# Patient Record
Sex: FEMALE | Race: WHITE | NOT HISPANIC OR LATINO | Employment: UNEMPLOYED | URBAN - METROPOLITAN AREA
[De-identification: names, ages, dates, MRNs, and addresses within clinical notes are randomized per-mention and may not be internally consistent; named-entity substitution may affect disease eponyms.]

---

## 2019-06-10 LAB
ABO + RH BLD: NORMAL
ABO + RH BLD: NORMAL
BLD GP AB SCN SERPL QL: NEGATIVE
HBV SURFACE AG SERPL QL IA: NEGATIVE
HIV 1+2 AB+HIV1 P24 AG SERPL QL IA: NEGATIVE
RUBELLA ANTIBODY IGG QUANTITATIVE: NORMAL IU/ML

## 2019-06-17 ENCOUNTER — ANESTHESIA (OUTPATIENT)
Dept: OBGYN | Facility: CLINIC | Age: 29
End: 2019-06-17
Payer: COMMERCIAL

## 2019-06-17 ENCOUNTER — HOSPITAL ENCOUNTER (INPATIENT)
Facility: CLINIC | Age: 29
LOS: 3 days | Discharge: HOME | End: 2019-06-20
Attending: OBSTETRICS & GYNECOLOGY | Admitting: OBSTETRICS & GYNECOLOGY
Payer: COMMERCIAL

## 2019-06-17 ENCOUNTER — ANESTHESIA EVENT (OUTPATIENT)
Dept: OBGYN | Facility: CLINIC | Age: 29
End: 2019-06-17
Payer: COMMERCIAL

## 2019-06-17 DIAGNOSIS — Z98.891 S/P CESAREAN SECTION: ICD-10-CM

## 2019-06-17 PROBLEM — O60.00 PREMATURE LABOR: Status: ACTIVE | Noted: 2019-06-17

## 2019-06-17 PROBLEM — O42.90 LEAKAGE OF AMNIOTIC FLUID: Status: ACTIVE | Noted: 2019-06-17

## 2019-06-17 PROBLEM — O42.90 RUPTURED, MEMBRANES, PREMATURE: Status: ACTIVE | Noted: 2019-06-17

## 2019-06-17 LAB
ABO + RH BLD: NORMAL
ABO + RH BLD: NORMAL
AMPHETAMINES UR QL SCN: NEGATIVE
BLD GP AB SCN SERPL QL: NORMAL
BLOOD BANK CMNT PATIENT-IMP: NORMAL
CANNABINOIDS UR QL: NEGATIVE
COCAINE UR QL: NEGATIVE
HGB BLD-MCNC: 11.5 G/DL (ref 11.7–15.7)
OPIATES UR QL SCN: NEGATIVE
PCP UR QL SCN: NEGATIVE
RUPTURE OF FETAL MEMBRANES BY ROM PLUS: POSITIVE
SPECIMEN EXP DATE BLD: NORMAL
T PALLIDUM AB SER QL: NONREACTIVE

## 2019-06-17 PROCEDURE — 86850 RBC ANTIBODY SCREEN: CPT | Performed by: OBSTETRICS & GYNECOLOGY

## 2019-06-17 PROCEDURE — 25000125 ZZHC RX 250

## 2019-06-17 PROCEDURE — 12000035 ZZH R&B POSTPARTUM

## 2019-06-17 PROCEDURE — 25800030 ZZH RX IP 258 OP 636: Performed by: OBSTETRICS & GYNECOLOGY

## 2019-06-17 PROCEDURE — 86901 BLOOD TYPING SEROLOGIC RH(D): CPT | Performed by: OBSTETRICS & GYNECOLOGY

## 2019-06-17 PROCEDURE — 25000128 H RX IP 250 OP 636: Performed by: NURSE ANESTHETIST, CERTIFIED REGISTERED

## 2019-06-17 PROCEDURE — 36000058 ZZH SURGERY LEVEL 3 EA 15 ADDTL MIN: Performed by: OBSTETRICS & GYNECOLOGY

## 2019-06-17 PROCEDURE — 71000014 ZZH RECOVERY PHASE 1 LEVEL 2 FIRST HR: Performed by: OBSTETRICS & GYNECOLOGY

## 2019-06-17 PROCEDURE — 36415 COLL VENOUS BLD VENIPUNCTURE: CPT | Performed by: OBSTETRICS & GYNECOLOGY

## 2019-06-17 PROCEDURE — 86900 BLOOD TYPING SEROLOGIC ABO: CPT | Performed by: OBSTETRICS & GYNECOLOGY

## 2019-06-17 PROCEDURE — 25000125 ZZHC RX 250: Performed by: NURSE ANESTHETIST, CERTIFIED REGISTERED

## 2019-06-17 PROCEDURE — 85018 HEMOGLOBIN: CPT | Performed by: OBSTETRICS & GYNECOLOGY

## 2019-06-17 PROCEDURE — 25000128 H RX IP 250 OP 636: Performed by: OBSTETRICS & GYNECOLOGY

## 2019-06-17 PROCEDURE — 37000009 ZZH ANESTHESIA TECHNICAL FEE, EACH ADDTL 15 MIN: Performed by: OBSTETRICS & GYNECOLOGY

## 2019-06-17 PROCEDURE — 86780 TREPONEMA PALLIDUM: CPT | Performed by: OBSTETRICS & GYNECOLOGY

## 2019-06-17 PROCEDURE — G0463 HOSPITAL OUTPT CLINIC VISIT: HCPCS | Mod: 25

## 2019-06-17 PROCEDURE — 25000128 H RX IP 250 OP 636

## 2019-06-17 PROCEDURE — 25000128 H RX IP 250 OP 636: Performed by: ANESTHESIOLOGY

## 2019-06-17 PROCEDURE — 80307 DRUG TEST PRSMV CHEM ANLYZR: CPT | Performed by: SPECIALIST

## 2019-06-17 PROCEDURE — 87653 STREP B DNA AMP PROBE: CPT | Performed by: OBSTETRICS & GYNECOLOGY

## 2019-06-17 PROCEDURE — 25800030 ZZH RX IP 258 OP 636: Performed by: NURSE ANESTHETIST, CERTIFIED REGISTERED

## 2019-06-17 PROCEDURE — 27210794 ZZH OR GENERAL SUPPLY STERILE: Performed by: OBSTETRICS & GYNECOLOGY

## 2019-06-17 PROCEDURE — 25000132 ZZH RX MED GY IP 250 OP 250 PS 637: Performed by: OBSTETRICS & GYNECOLOGY

## 2019-06-17 PROCEDURE — 59025 FETAL NON-STRESS TEST: CPT

## 2019-06-17 PROCEDURE — 37000008 ZZH ANESTHESIA TECHNICAL FEE, 1ST 30 MIN: Performed by: OBSTETRICS & GYNECOLOGY

## 2019-06-17 PROCEDURE — 36000056 ZZH SURGERY LEVEL 3 1ST 30 MIN: Performed by: OBSTETRICS & GYNECOLOGY

## 2019-06-17 PROCEDURE — 84112 EVAL AMNIOTIC FLUID PROTEIN: CPT | Performed by: OBSTETRICS & GYNECOLOGY

## 2019-06-17 RX ORDER — LANOLIN 100 %
OINTMENT (GRAM) TOPICAL
Status: DISCONTINUED | OUTPATIENT
Start: 2019-06-17 | End: 2019-06-20 | Stop reason: HOSPADM

## 2019-06-17 RX ORDER — MULTIVITAMIN WITH IRON
1 TABLET ORAL DAILY
Status: ON HOLD | COMMUNITY
End: 2022-04-27

## 2019-06-17 RX ORDER — METOCLOPRAMIDE HYDROCHLORIDE 5 MG/ML
10 INJECTION INTRAMUSCULAR; INTRAVENOUS ONCE
Status: COMPLETED | OUTPATIENT
Start: 2019-06-17 | End: 2019-06-17

## 2019-06-17 RX ORDER — KETOROLAC TROMETHAMINE 30 MG/ML
30 INJECTION, SOLUTION INTRAMUSCULAR; INTRAVENOUS EVERY 6 HOURS
Status: COMPLETED | OUTPATIENT
Start: 2019-06-17 | End: 2019-06-18

## 2019-06-17 RX ORDER — CLINDAMYCIN PHOSPHATE 900 MG/50ML
900 INJECTION, SOLUTION INTRAVENOUS
Status: COMPLETED | OUTPATIENT
Start: 2019-06-17 | End: 2019-06-17

## 2019-06-17 RX ORDER — SCOLOPAMINE TRANSDERMAL SYSTEM 1 MG/1
1 PATCH, EXTENDED RELEASE TRANSDERMAL
Status: COMPLETED | OUTPATIENT
Start: 2019-06-17 | End: 2019-06-17

## 2019-06-17 RX ORDER — AMOXICILLIN 250 MG
2 CAPSULE ORAL 2 TIMES DAILY PRN
Status: DISCONTINUED | OUTPATIENT
Start: 2019-06-17 | End: 2019-06-20 | Stop reason: HOSPADM

## 2019-06-17 RX ORDER — BUPIVACAINE HYDROCHLORIDE 7.5 MG/ML
INJECTION, SOLUTION INTRASPINAL
Status: DISCONTINUED
Start: 2019-06-17 | End: 2019-06-17 | Stop reason: HOSPADM

## 2019-06-17 RX ORDER — SCOLOPAMINE TRANSDERMAL SYSTEM 1 MG/1
PATCH, EXTENDED RELEASE TRANSDERMAL
Status: COMPLETED
Start: 2019-06-17 | End: 2019-06-17

## 2019-06-17 RX ORDER — ONDANSETRON 2 MG/ML
4 INJECTION INTRAMUSCULAR; INTRAVENOUS EVERY 6 HOURS PRN
Status: DISCONTINUED | OUTPATIENT
Start: 2019-06-17 | End: 2019-06-20 | Stop reason: HOSPADM

## 2019-06-17 RX ORDER — MORPHINE SULFATE 1 MG/ML
150 INJECTION, SOLUTION EPIDURAL; INTRATHECAL; INTRAVENOUS ONCE
Status: COMPLETED | OUTPATIENT
Start: 2019-06-17 | End: 2019-06-17

## 2019-06-17 RX ORDER — LIDOCAINE 40 MG/G
CREAM TOPICAL
Status: DISCONTINUED | OUTPATIENT
Start: 2019-06-17 | End: 2019-06-17

## 2019-06-17 RX ORDER — METOCLOPRAMIDE HYDROCHLORIDE 5 MG/ML
INJECTION INTRAMUSCULAR; INTRAVENOUS
Status: DISCONTINUED
Start: 2019-06-17 | End: 2019-06-17 | Stop reason: HOSPADM

## 2019-06-17 RX ORDER — NALOXONE HYDROCHLORIDE 0.4 MG/ML
.1-.4 INJECTION, SOLUTION INTRAMUSCULAR; INTRAVENOUS; SUBCUTANEOUS
Status: DISCONTINUED | OUTPATIENT
Start: 2019-06-17 | End: 2019-06-20 | Stop reason: HOSPADM

## 2019-06-17 RX ORDER — EPHEDRINE SULFATE 50 MG/ML
INJECTION, SOLUTION INTRAMUSCULAR; INTRAVENOUS; SUBCUTANEOUS PRN
Status: DISCONTINUED | OUTPATIENT
Start: 2019-06-17 | End: 2019-06-17

## 2019-06-17 RX ORDER — CLINDAMYCIN PHOSPHATE 900 MG/50ML
INJECTION, SOLUTION INTRAVENOUS
Status: DISCONTINUED
Start: 2019-06-17 | End: 2019-06-17 | Stop reason: HOSPADM

## 2019-06-17 RX ORDER — HYDROCORTISONE 2.5 %
CREAM (GRAM) TOPICAL 3 TIMES DAILY PRN
Status: DISCONTINUED | OUTPATIENT
Start: 2019-06-17 | End: 2019-06-20 | Stop reason: HOSPADM

## 2019-06-17 RX ORDER — IBUPROFEN 600 MG/1
600 TABLET, FILM COATED ORAL EVERY 6 HOURS PRN
Status: DISCONTINUED | OUTPATIENT
Start: 2019-06-18 | End: 2019-06-20 | Stop reason: HOSPADM

## 2019-06-17 RX ORDER — SIMETHICONE 80 MG
80 TABLET,CHEWABLE ORAL 4 TIMES DAILY PRN
Status: DISCONTINUED | OUTPATIENT
Start: 2019-06-17 | End: 2019-06-20 | Stop reason: HOSPADM

## 2019-06-17 RX ORDER — ACETAMINOPHEN 325 MG/1
975 TABLET ORAL EVERY 8 HOURS
Status: DISPENSED | OUTPATIENT
Start: 2019-06-17 | End: 2019-06-20

## 2019-06-17 RX ORDER — OXYTOCIN/0.9 % SODIUM CHLORIDE 30/500 ML
PLASTIC BAG, INJECTION (ML) INTRAVENOUS
Status: DISCONTINUED
Start: 2019-06-17 | End: 2019-06-17 | Stop reason: HOSPADM

## 2019-06-17 RX ORDER — MORPHINE SULFATE 1 MG/ML
INJECTION, SOLUTION EPIDURAL; INTRATHECAL; INTRAVENOUS
Status: DISCONTINUED
Start: 2019-06-17 | End: 2019-06-17 | Stop reason: HOSPADM

## 2019-06-17 RX ORDER — LIDOCAINE 40 MG/G
CREAM TOPICAL
Status: DISCONTINUED | OUTPATIENT
Start: 2019-06-17 | End: 2019-06-20 | Stop reason: HOSPADM

## 2019-06-17 RX ORDER — NALBUPHINE HYDROCHLORIDE 10 MG/ML
2.5-5 INJECTION, SOLUTION INTRAMUSCULAR; INTRAVENOUS; SUBCUTANEOUS EVERY 6 HOURS PRN
Status: DISCONTINUED | OUTPATIENT
Start: 2019-06-17 | End: 2019-06-17

## 2019-06-17 RX ORDER — EPHEDRINE SULFATE 50 MG/ML
5 INJECTION, SOLUTION INTRAMUSCULAR; INTRAVENOUS; SUBCUTANEOUS
Status: DISCONTINUED | OUTPATIENT
Start: 2019-06-17 | End: 2019-06-17

## 2019-06-17 RX ORDER — ONDANSETRON 2 MG/ML
4 INJECTION INTRAMUSCULAR; INTRAVENOUS EVERY 6 HOURS PRN
Status: DISCONTINUED | OUTPATIENT
Start: 2019-06-17 | End: 2019-06-17

## 2019-06-17 RX ORDER — ONDANSETRON 2 MG/ML
INJECTION INTRAMUSCULAR; INTRAVENOUS
Status: DISCONTINUED
Start: 2019-06-17 | End: 2019-06-17 | Stop reason: HOSPADM

## 2019-06-17 RX ORDER — BUPIVACAINE HYDROCHLORIDE 7.5 MG/ML
INJECTION, SOLUTION INTRASPINAL PRN
Status: DISCONTINUED | OUTPATIENT
Start: 2019-06-17 | End: 2019-06-17

## 2019-06-17 RX ORDER — CITRIC ACID/SODIUM CITRATE 334-500MG
30 SOLUTION, ORAL ORAL
Status: COMPLETED | OUTPATIENT
Start: 2019-06-17 | End: 2019-06-17

## 2019-06-17 RX ORDER — HYDROMORPHONE HYDROCHLORIDE 1 MG/ML
.3-.5 INJECTION, SOLUTION INTRAMUSCULAR; INTRAVENOUS; SUBCUTANEOUS EVERY 30 MIN PRN
Status: DISCONTINUED | OUTPATIENT
Start: 2019-06-17 | End: 2019-06-20 | Stop reason: HOSPADM

## 2019-06-17 RX ORDER — NALOXONE HYDROCHLORIDE 0.4 MG/ML
.1-.4 INJECTION, SOLUTION INTRAMUSCULAR; INTRAVENOUS; SUBCUTANEOUS
Status: DISCONTINUED | OUTPATIENT
Start: 2019-06-17 | End: 2019-06-17

## 2019-06-17 RX ORDER — AMOXICILLIN 250 MG
1 CAPSULE ORAL 2 TIMES DAILY PRN
Status: DISCONTINUED | OUTPATIENT
Start: 2019-06-17 | End: 2019-06-20 | Stop reason: HOSPADM

## 2019-06-17 RX ORDER — OXYTOCIN/0.9 % SODIUM CHLORIDE 30/500 ML
340 PLASTIC BAG, INJECTION (ML) INTRAVENOUS CONTINUOUS PRN
Status: DISCONTINUED | OUTPATIENT
Start: 2019-06-17 | End: 2019-06-20 | Stop reason: HOSPADM

## 2019-06-17 RX ORDER — OXYTOCIN 10 [USP'U]/ML
10 INJECTION, SOLUTION INTRAMUSCULAR; INTRAVENOUS
Status: DISCONTINUED | OUTPATIENT
Start: 2019-06-17 | End: 2019-06-20 | Stop reason: HOSPADM

## 2019-06-17 RX ORDER — DEXTROSE, SODIUM CHLORIDE, SODIUM LACTATE, POTASSIUM CHLORIDE, AND CALCIUM CHLORIDE 5; .6; .31; .03; .02 G/100ML; G/100ML; G/100ML; G/100ML; G/100ML
INJECTION, SOLUTION INTRAVENOUS CONTINUOUS
Status: DISCONTINUED | OUTPATIENT
Start: 2019-06-17 | End: 2019-06-20 | Stop reason: HOSPADM

## 2019-06-17 RX ORDER — OXYCODONE HYDROCHLORIDE 5 MG/1
5-10 TABLET ORAL
Status: DISCONTINUED | OUTPATIENT
Start: 2019-06-17 | End: 2019-06-20 | Stop reason: HOSPADM

## 2019-06-17 RX ORDER — BISACODYL 10 MG
10 SUPPOSITORY, RECTAL RECTAL DAILY PRN
Status: DISCONTINUED | OUTPATIENT
Start: 2019-06-19 | End: 2019-06-20 | Stop reason: HOSPADM

## 2019-06-17 RX ORDER — ACETAMINOPHEN 325 MG/1
650 TABLET ORAL EVERY 4 HOURS PRN
Status: DISCONTINUED | OUTPATIENT
Start: 2019-06-20 | End: 2019-06-20 | Stop reason: HOSPADM

## 2019-06-17 RX ORDER — KETOROLAC TROMETHAMINE 30 MG/ML
INJECTION, SOLUTION INTRAMUSCULAR; INTRAVENOUS
Status: COMPLETED
Start: 2019-06-17 | End: 2019-06-17

## 2019-06-17 RX ORDER — OXYTOCIN/0.9 % SODIUM CHLORIDE 30/500 ML
100 PLASTIC BAG, INJECTION (ML) INTRAVENOUS CONTINUOUS
Status: DISCONTINUED | OUTPATIENT
Start: 2019-06-17 | End: 2019-06-20 | Stop reason: HOSPADM

## 2019-06-17 RX ORDER — ONDANSETRON 2 MG/ML
INJECTION INTRAMUSCULAR; INTRAVENOUS
Status: COMPLETED
Start: 2019-06-17 | End: 2019-06-17

## 2019-06-17 RX ORDER — OXYTOCIN/0.9 % SODIUM CHLORIDE 30/500 ML
PLASTIC BAG, INJECTION (ML) INTRAVENOUS CONTINUOUS PRN
Status: DISCONTINUED | OUTPATIENT
Start: 2019-06-17 | End: 2019-06-17

## 2019-06-17 RX ORDER — SODIUM CHLORIDE, SODIUM LACTATE, POTASSIUM CHLORIDE, CALCIUM CHLORIDE 600; 310; 30; 20 MG/100ML; MG/100ML; MG/100ML; MG/100ML
INJECTION, SOLUTION INTRAVENOUS CONTINUOUS
Status: DISCONTINUED | OUTPATIENT
Start: 2019-06-17 | End: 2019-06-17

## 2019-06-17 RX ORDER — FERROUS GLUCONATE 324(38)MG
324 TABLET ORAL
COMMUNITY

## 2019-06-17 RX ORDER — PRENATAL VIT/IRON FUM/FOLIC AC 27MG-0.8MG
1 TABLET ORAL DAILY
COMMUNITY

## 2019-06-17 RX ORDER — ONDANSETRON 2 MG/ML
INJECTION INTRAMUSCULAR; INTRAVENOUS PRN
Status: DISCONTINUED | OUTPATIENT
Start: 2019-06-17 | End: 2019-06-17

## 2019-06-17 RX ADMIN — ONDANSETRON 4 MG: 2 INJECTION INTRAMUSCULAR; INTRAVENOUS at 07:54

## 2019-06-17 RX ADMIN — CLINDAMYCIN PHOSPHATE 900 MG: 900 INJECTION, SOLUTION INTRAVENOUS at 07:02

## 2019-06-17 RX ADMIN — KETOROLAC TROMETHAMINE 30 MG: 30 INJECTION, SOLUTION INTRAMUSCULAR at 09:06

## 2019-06-17 RX ADMIN — PHENYLEPHRINE HYDROCHLORIDE 0.2 MCG/KG/MIN: 10 INJECTION INTRAVENOUS at 07:21

## 2019-06-17 RX ADMIN — METOCLOPRAMIDE 10 MG: 5 INJECTION, SOLUTION INTRAMUSCULAR; INTRAVENOUS at 11:48

## 2019-06-17 RX ADMIN — SODIUM CITRATE AND CITRIC ACID MONOHYDRATE 30 ML: 500; 334 SOLUTION ORAL at 06:58

## 2019-06-17 RX ADMIN — Medication 10 MG: at 07:31

## 2019-06-17 RX ADMIN — SODIUM CHLORIDE, POTASSIUM CHLORIDE, SODIUM LACTATE AND CALCIUM CHLORIDE: 600; 310; 30; 20 INJECTION, SOLUTION INTRAVENOUS at 08:08

## 2019-06-17 RX ADMIN — OXYTOCIN 100 ML/HR: 10 INJECTION INTRAVENOUS at 11:47

## 2019-06-17 RX ADMIN — KETOROLAC TROMETHAMINE 30 MG: 30 INJECTION, SOLUTION INTRAMUSCULAR at 15:13

## 2019-06-17 RX ADMIN — KETOROLAC TROMETHAMINE 30 MG: 30 INJECTION, SOLUTION INTRAMUSCULAR at 21:10

## 2019-06-17 RX ADMIN — ONDANSETRON 4 MG: 2 INJECTION INTRAMUSCULAR; INTRAVENOUS at 09:09

## 2019-06-17 RX ADMIN — SODIUM CHLORIDE, POTASSIUM CHLORIDE, SODIUM LACTATE AND CALCIUM CHLORIDE: 600; 310; 30; 20 INJECTION, SOLUTION INTRAVENOUS at 06:59

## 2019-06-17 RX ADMIN — Medication 5 MG: at 07:48

## 2019-06-17 RX ADMIN — BUPIVACAINE HYDROCHLORIDE IN DEXTROSE 12 MG: 7.5 INJECTION, SOLUTION SUBARACHNOID at 07:16

## 2019-06-17 RX ADMIN — SODIUM CHLORIDE, SODIUM LACTATE, POTASSIUM CHLORIDE, CALCIUM CHLORIDE AND DEXTROSE MONOHYDRATE: 5; 600; 310; 30; 20 INJECTION, SOLUTION INTRAVENOUS at 16:56

## 2019-06-17 RX ADMIN — PHENYLEPHRINE HYDROCHLORIDE 100 MCG: 10 INJECTION INTRAVENOUS at 07:20

## 2019-06-17 RX ADMIN — SODIUM CHLORIDE, POTASSIUM CHLORIDE, SODIUM LACTATE AND CALCIUM CHLORIDE 1000 ML: 600; 310; 30; 20 INJECTION, SOLUTION INTRAVENOUS at 06:26

## 2019-06-17 RX ADMIN — GENTAMICIN SULFATE 90 MG: 40 INJECTION, SOLUTION INTRAMUSCULAR; INTRAVENOUS at 07:30

## 2019-06-17 RX ADMIN — PHENYLEPHRINE HYDROCHLORIDE 100 MCG: 10 INJECTION INTRAVENOUS at 08:08

## 2019-06-17 RX ADMIN — SCOLOPAMINE TRANSDERMAL SYSTEM 1 PATCH: 1 PATCH, EXTENDED RELEASE TRANSDERMAL at 09:09

## 2019-06-17 RX ADMIN — MORPHINE SULFATE 0.15 MG: 1 INJECTION, SOLUTION EPIDURAL; INTRATHECAL; INTRAVENOUS at 07:16

## 2019-06-17 RX ADMIN — ONDANSETRON 4 MG: 2 INJECTION INTRAMUSCULAR; INTRAVENOUS at 15:52

## 2019-06-17 RX ADMIN — OXYTOCIN-SODIUM CHLORIDE 0.9% IV SOLN 30 UNIT/500ML 340 ML/HR: 30-0.9/5 SOLUTION at 07:42

## 2019-06-17 RX ADMIN — PROCHLORPERAZINE EDISYLATE 5 MG: 5 INJECTION INTRAMUSCULAR; INTRAVENOUS at 10:23

## 2019-06-17 RX ADMIN — SCOPALAMINE 1 PATCH: 1 PATCH, EXTENDED RELEASE TRANSDERMAL at 09:09

## 2019-06-17 ASSESSMENT — MIFFLIN-ST. JEOR: SCORE: 1372.76

## 2019-06-17 NOTE — PLAN OF CARE
ROM + results are positive (see lab report).  Unsure of patient positioning in the clinic so patient will be sent for an US to verify positioning.  UCs ever 2-5 minutes with irritability, patient feeling mild cramping; cervix checked and found to be 1cm/50%/-2

## 2019-06-17 NOTE — H&P
OB  Admit H&P    Pt is a 28 year old  @ 35w6d who presented to L&D with SROM, RUC and Breech.    Patient's prenatal course has been complicated by transfer of care from [place], breech and low fluid on previous ultrasound    Prenatal Labs:    Blood type O pos  Rubella immune  RPR NR  HB neg  HIV neg  GCT unknown  GBS not obtained    Medical History:  1. noncontributory    Surgical History:  1. none    OB History:  1. First pregnancy    Medications:   1. PNV    Allergies:   1. PCN- rash    ROS:  LOF    Physical Exam:  /80   Temp 98.7  F (37.1  C) (Temporal)   Resp 18   Gen:  A&O, NAD  Lungs:  CTAB  CV:  RRR, no murmur  Abd:  Gravid, EFW 6.5 lb  EFM:  140 moderate variability, reactive NST  [place]: Q 1-5 min  SVE: 1/50%/-2  Membranes:  ruptured   Ultrasound: Breech    Assessment:  28 year old  @ 35w6d admitted for SROM, RUC, Breech    Plan:  1. Proceed to OR for  delivery   2. Risks, benefits and alternatives were discussed with patient including, but not limited to, infection, bleeding, injury to surrounding structures.  Pt signed informed consent to proceed.  3.  Administer 900 mg of Clinda    Natalya Quan  2019  6:53 AM

## 2019-06-17 NOTE — ANESTHESIA POSTPROCEDURE EVALUATION
Patient: Otilia Kuhn    Procedure(s):   SECTION    Diagnosis:pregnancy  Diagnosis Additional Information: No value filed.    Anesthesia Type:  Spinal    Note:  Anesthesia Post Evaluation    Patient location during evaluation: PACU  Patient participation: Able to fully participate in evaluation  Level of consciousness: awake  Pain management: adequate  Airway patency: patent  Cardiovascular status: acceptable  Respiratory status: acceptable  Hydration status: acceptable  PONV: controlled     Anesthetic complications: None          Last vitals:  Vitals:    19 1030 19 1045 19 1115   BP: 96/65 95/65 102/62   Pulse: 76 66    Resp: 17 16 14   Temp: 35.4  C (95.7  F) 34.7  C (94.5  F) 35.6  C (96  F)   SpO2: 96% 94% 96%         Electronically Signed By: Gilberto Brand MD  2019  12:22 PM

## 2019-06-17 NOTE — PLAN OF CARE
35+6  presents to the MAC for fluid leak.  Monitors applied with verbal consent.  Admission database completed.  VSS.  Patient states her leaking of fluid started around 3:30 am and has been clear in color.  ROM plus sent.

## 2019-06-17 NOTE — ANESTHESIA CARE TRANSFER NOTE
Patient: Otilia Kuhn    Procedure(s):   SECTION    Diagnosis: pregnancy  Diagnosis Additional Information: No value filed.    Anesthesia Type:   Spinal     Note:  Airway :Room Air  Patient transferred to:PACU  Comments: To OB PACU: Awake/alert, VSS  Report to RNHandoff Report: Identifed the Patient, Identified the Reponsible Provider, Reviewed the pertinent medical history, Discussed the surgical course, Reviewed Intra-OP anesthesia mangement and issues during anesthesia, Set expectations for post-procedure period and Allowed opportunity for questions and acknowledgement of understanding      Vitals: (Last set prior to Anesthesia Care Transfer)    CRNA VITALS  2019 0755 - 2019 0834      2019             Resp Rate (set):  10                Electronically Signed By: RADHA Camarena CRNA  2019  8:34 AM

## 2019-06-17 NOTE — PLAN OF CARE
Data: Otilia Kuhn transferred to 402 via bed at 1130. Baby transferred via parent's arms.  Action: Receiving unit notified of transfer: Yes. Patient and family notified of room change. Report given to Merissa CERNA RN at 1130. Belongings sent to receiving unit. Accompanied by Registered Nurse. Oriented patient to surroundings. Call light within reach. ID bands double-checked with receiving RN.  Response: Patient tolerated transfer and is stable.    1148: Pt given 10mg IV Reglan for nausea per Dr. Hugo's order. Receiving nurse (Merissa CERNA) will page Dr. Hugo if pt continues to have N/V despite meds.

## 2019-06-17 NOTE — ANESTHESIA PROCEDURE NOTES
Peripheral nerve/Neuraxial procedure note : intrathecal  Pre-Procedure  Performed by Gilberto Brand MD  Location: OR      Pre-Anesthestic Checklist: patient identified, IV checked, risks and benefits discussed, informed consent, monitors and equipment checked and pre-op evaluation    Timeout  Correct Patient: Yes   Correct Procedure: Yes       Correct Position: Yes     .   Procedure Documentation    .    Procedure:    Intrathecal.  Insertion Site:L3-4  (midline approach)      Patient Prep;mask, sterile gloves, povidone-iodine 7.5% surgical scrub, patient draped.  .  Needle: Dolly-Stanley Spinal Needle (gauge): 24  Spinal/LP Needle Length (inches): 3.5 # of attempts: 1 and # of redirects:  Introducer used .       Assessment/Narrative  Paresthesias: No.  .  .  clear CSF fluid removed . Comments:  1% lidocaine to skin  No complications

## 2019-06-17 NOTE — BRIEF OP NOTE
[facility]    Brief Operative Note    Pre-operative diagnosis: 35w6d, SROM, Breech, labor  Post-operative diagnosis same  Procedure: Procedure(s):   SECTION  Surgeon: Surgeon(s) and Role:     * Natalya Quan MD - Primary  Anesthesia: Spinal   Estimated blood loss: 465 cc  Drains: None  Specimens:   ID Type Source Tests Collected by Time Destination   1 :  Cord blood Umbilical Cord OR DOCUMENTATION ONLY Angie Mackey RN 2019  7:40 AM    2 :  Tissue Umbilical Cord OR DOCUMENTATION ONLY Angie Mackey RN 2019  7:42 AM    3 :  Placenta Placenta SPECIMEN DISCARDED, OR DOCUMENTATION ONLY Angie Mackey RN 2019  7:42 AM      Findings: viable female, son breech, APGArs 8/9, wT 6 LB   .  Complications: None.  Implants:  * No implants in log *

## 2019-06-17 NOTE — PROVIDER NOTIFICATION
06/17/19 1118   Provider Notification   Provider Name/Title Dr. Hugo   Method of Notification Phone   Request Evaluate-Remote   Notification Reason Medication Request     Spoke to Dr. Hugo via phone & received T.O.R.B. For 10mg IV Reglan for continued N/V.

## 2019-06-17 NOTE — PROVIDER NOTIFICATION
06/17/19 0957   Provider Notification   Provider Name/Title Dr. Hugo    Method of Notification Phone   Request Evaluate-Remote   Notification Reason Medication Request     Spoke with Dr. Hugo via phone & received T.O.R.B. For 5mg IV Compazine for pts continued N/V, despite having received 8mg IV Zofran total & having Scopolamine patch in place.

## 2019-06-17 NOTE — OP NOTE
Procedure Date: 2019      PREOPERATIVE DIAGNOSES:   1.  A 35 and 6-week gestation.   2.  Spontaneous rupture of membranes at 3:30 a.m.   3.  Breech presentation.   4.  Labor.      POSTOPERATIVE DIAGNOSES:     1.  A 35 and 6-week gestation.   2.  Spontaneous rupture of membranes at 3:30 a.m.   3.  Breech presentation.   4.  Labor.      PROCEDURE:  Primary  low transverse caesarean section with 2-layer closure.      SURGEON:  Natalya Quan MD      ASSISTANT:  None.      ANESTHESIA:  Spinal.      ESTIMATED BLOOD LOSS:  465.      INTRAVENOUS FLUIDS:  2000 mL      URINE OUTPUT:  300 mL.      SPECIMENS:  None.      DISPOSITION:  To recovery room in stable condition.      FINDINGS:  Viable female delivered from a son breech presentation.  Weight is 6 pounds, Apgars 8 and 9.      INDICATIONS:  The patient is a 28-year-old  1, para 0 who was admitted to Labor and Delivery early this morning after having spontaneous rupture of membranes of clear fluid at 3:30 a.m.  Upon presentation to Labor and Delivery, clear fluid was noted.  This was confirmed with an AmniSure being positive and she was noted to be nicholas every 1-5 minutes.  Her cervix was checked and she was 1 cm, 50% effaced, -2 station.  She had given a previous history of noting to be breech on her last ultrasound.  I came in and performed a bedside ultrasound which confirmed a breech presentation.  Therefore, consent was obtained for primary low transverse  section secondary to  rupture of membranes, in labor with a breech presentation.  All risks and benefits were discussed with the patient at length and all questions were answered and consent was obtained.      PROCEDURE:  The patient was taken to the operating room where she was given a spinal for anesthesia.  She was then prepped and draped in the normal sterile fashion in the dorsal supine position with a leftward tilt.  She was tested for adequate anesthesia and then a timeout  was performed.  A low transverse incision was made with the scalpel and carried down to the underlying fascia of infection was then nicked in the midline.  The fascial incision was extended laterally using the Banks scissors.  Next, the fascia was dissected off the underlying rectus muscles with blunt as well as sharp dissection.  The rectus muscles were  in the midline and the peritoneum was identified and sharply entered with the Metzenbaum scissors.  The peritoneal incision was then extended superiorly as well as inferiorly with adequate visualization of the bladder.  The bladder blade was then inserted and a bladder flap was created, displacing the bladder inferiorly.  A low transverse incision was then made and upon entering the uterus, the uterine incision was then manually extended.  The baby was delivered from a sno breech presentation with the usual maneuvers atraumatically. Delayed cord clamping was then performed.  Mouth and nose were bulb suctioned on the abdomen.  The baby was handed off to the NICU team in attendance.  The placenta was then expressed from the uterus.  The uterus was cleared of all membranes and clot and the uterine incision was closed with 0 Vicryl in a continuous locking fashion.  A second imbricating layer with 0 Monocryl in a continuous fashion was placed for excellent effect.  The gutters were then cleared of all clots.  The uterine incision was inspected and noted to be hemostatic.  Bladder blade was then removed.  The rectus muscles and peritoneum were reapproximated with 3-0 Vicryl interrupted sutures.  The fascia was then closed with 0 Vicryl in a continuous fashion.  Subcutaneous tissue was reapproximated with 2-0 Vicryl interrupted sutures and the skin was closed with staples.  The patient received 900 mg of clindamycin as well as gentamicin preoperatively secondary to PENICILLIN ALLERGY.  She was taken to recovery room in stable condition.         RENITA PARADA  MD             D: 2019   T: 2019   MT: RAKAN      Name:     DANTE FELICIANO   MRN:      [id number]        Account:        [id number]   :      1990           Procedure Date: 2019      Document: [id number]

## 2019-06-17 NOTE — ANESTHESIA PREPROCEDURE EVALUATION
Anesthesia Pre-Procedure Evaluation    Patient: Otilia Kuhn   MRN: [id number] : 1990          Preoperative Diagnosis: pregnancy    Procedure(s):   SECTION    History reviewed. No pertinent past medical history.  History reviewed. No pertinent surgical history.    Anesthesia Evaluation     . Pt has had prior anesthetic.     No history of anesthetic complications          ROS/MED HX    ENT/Pulmonary:      (-) sleep apnea   Neurologic:       Cardiovascular:         METS/Exercise Tolerance:     Hematologic:         Musculoskeletal:         GI/Hepatic:        (-) GERD   Renal/Genitourinary:         Endo:         Psychiatric:         Infectious Disease:         Malignancy:         Other:                          Physical Exam  Normal systems: cardiovascular, pulmonary and dental    Airway   Mallampati: II  TM distance: >3 FB  Neck ROM: full    Dental     Cardiovascular       Pulmonary             Lab Results   Component Value Date    HGB 11.5 (L) 2019       Preop Vitals  BP Readings from Last 3 Encounters:   19 116/80    Pulse Readings from Last 3 Encounters:   No data found for Pulse      Resp Readings from Last 3 Encounters:   19 18    SpO2 Readings from Last 3 Encounters:   No data found for SpO2      Temp Readings from Last 1 Encounters:   19 37.1  C (98.7  F) (Temporal)    Ht Readings from Last 1 Encounters:   No data found for Ht      Wt Readings from Last 1 Encounters:   No data found for Wt    There is no height or weight on file to calculate BMI.       Anesthesia Plan      History & Physical Review  History and physical reviewed and following examination; no interval change.    ASA Status:  2 .    NPO Status:  > 8 hours    Plan for Spinal   PONV prophylaxis:  Ondansetron (or other 5HT-3)  Healthy primip, 35 weeks, SROM and nicholas.      Morphine to help with postop pain      Postoperative Care  Postoperative pain management:  IV analgesics and Neuraxial analgesia.       Consents  Anesthetic plan, risks, benefits and alternatives discussed with:  Patient..                 Gilberto Brand MD

## 2019-06-17 NOTE — PROVIDER NOTIFICATION
06/17/19 1116   Provider Notification   Provider Name/Title Dr. Hugo   Method of Notification Electronic Page   Request Evaluate-Remote   Notification Reason Medication Request     Dr. Hugo paged & notified of pts N/V.

## 2019-06-17 NOTE — PLAN OF CARE
Pt. arrived on floor at 1130, oriented to room and safety protocols. VSS, fundus firm, scant flow, one baseball size clot this afternoon, no free flow, no further clots noted. Drsg. CDI, IV Pitocin infusing. Acharya draining clear, graham urine. Scopolamine patch in place, no further IV meds. given on floor. Using aromatherapy, cool washcloth, fan. Able to rest this afternoon. Pt. turned side to side in bed and luis alberto care done his afternoon. Nauseous after moving but did not have an emesis. Unable to take anything by mouth. Attempted to breastfeed baby after transfer to floor but mom was exhausted and baby was suppl. with HDM by finger feeding.

## 2019-06-18 LAB
GP B STREP DNA SPEC QL NAA+PROBE: NEGATIVE
HGB BLD-MCNC: 8.9 G/DL (ref 11.7–15.7)
SPECIMEN SOURCE: NORMAL

## 2019-06-18 PROCEDURE — 12000035 ZZH R&B POSTPARTUM

## 2019-06-18 PROCEDURE — 90715 TDAP VACCINE 7 YRS/> IM: CPT | Performed by: OBSTETRICS & GYNECOLOGY

## 2019-06-18 PROCEDURE — 36415 COLL VENOUS BLD VENIPUNCTURE: CPT | Performed by: OBSTETRICS & GYNECOLOGY

## 2019-06-18 PROCEDURE — 25000132 ZZH RX MED GY IP 250 OP 250 PS 637: Performed by: OBSTETRICS & GYNECOLOGY

## 2019-06-18 PROCEDURE — 25000128 H RX IP 250 OP 636: Performed by: OBSTETRICS & GYNECOLOGY

## 2019-06-18 PROCEDURE — 85018 HEMOGLOBIN: CPT | Performed by: OBSTETRICS & GYNECOLOGY

## 2019-06-18 RX ADMIN — OXYCODONE HYDROCHLORIDE 5 MG: 5 TABLET ORAL at 11:28

## 2019-06-18 RX ADMIN — OXYCODONE HYDROCHLORIDE 5 MG: 5 TABLET ORAL at 19:00

## 2019-06-18 RX ADMIN — OXYCODONE HYDROCHLORIDE 5 MG: 5 TABLET ORAL at 22:02

## 2019-06-18 RX ADMIN — IBUPROFEN 600 MG: 600 TABLET ORAL at 16:44

## 2019-06-18 RX ADMIN — ACETAMINOPHEN 975 MG: 325 TABLET, FILM COATED ORAL at 16:44

## 2019-06-18 RX ADMIN — KETOROLAC TROMETHAMINE 30 MG: 30 INJECTION, SOLUTION INTRAMUSCULAR at 02:59

## 2019-06-18 RX ADMIN — CLOSTRIDIUM TETANI TOXOID ANTIGEN (FORMALDEHYDE INACTIVATED), CORYNEBACTERIUM DIPHTHERIAE TOXOID ANTIGEN (FORMALDEHYDE INACTIVATED), BORDETELLA PERTUSSIS TOXOID ANTIGEN (GLUTARALDEHYDE INACTIVATED), BORDETELLA PERTUSSIS FILAMENTOUS HEMAGGLUTININ ANTIGEN (FORMALDEHYDE INACTIVATED), BORDETELLA PERTUSSIS PERTACTIN ANTIGEN, AND BORDETELLA PERTUSSIS FIMBRIAE 2/3 ANTIGEN 0.5 ML: 5; 2; 2.5; 5; 3; 5 INJECTION, SUSPENSION INTRAMUSCULAR at 09:41

## 2019-06-18 RX ADMIN — SENNOSIDES AND DOCUSATE SODIUM 1 TABLET: 8.6; 5 TABLET ORAL at 09:41

## 2019-06-18 RX ADMIN — SENNOSIDES AND DOCUSATE SODIUM 2 TABLET: 8.6; 5 TABLET ORAL at 22:02

## 2019-06-18 RX ADMIN — ACETAMINOPHEN 975 MG: 325 TABLET, FILM COATED ORAL at 01:00

## 2019-06-18 RX ADMIN — IBUPROFEN 600 MG: 600 TABLET ORAL at 09:41

## 2019-06-18 RX ADMIN — ACETAMINOPHEN 975 MG: 325 TABLET, FILM COATED ORAL at 09:41

## 2019-06-18 NOTE — PLAN OF CARE
VSS. Incision C/D/I. Up to bathroom with assist, tolerated well. Pain well controlled with Tylenol, Ibuprofen, and Oxycodone. Using an abdominal binder for comfort. Tolerating a regular diet, passing gas. Voiding adequately; IV discontinued. Working on breastfeeding  using an SNS at the breast. Pumping and supplementing  with EBM and formula. Working on  cares with assist from writer. Continue to monitor and notify MD as needed.

## 2019-06-18 NOTE — PLAN OF CARE
VSS.  Incision C/D/I.  Denies pain. Toradol given, Tylenol held due to nausea & veining most evening. Had 30 ml emesis at 1600. Zofran repeated with good results. Faint BS earlier now very active but no flatus yet. Tolerated only few ice chips most evening but nausea has resolved and is drinking apple juice and eating soda crackers @ 2230. Lungs clear, SATs % room air. Two Assist to bathroom for HS, gill cath, luis alberto cares and brushed teeth and tolerated activity well. Micheal camacho  who is LTP and educated on LPT routines, blood sugars, CST ect. Sleepy at breast but did fair feeds x 2 with encouragement. Supplemented x 1 with HDM per pt.'s request but now wants only formula.Educated on finger feeding and formula. Teaching  cares.  here, Mother arrived from [place] tonight and will stay here overnight with pt.

## 2019-06-18 NOTE — LACTATION NOTE
Initial Lactation visit. LPT infant CGA 36+0 today. Infant feeding well; just finished feeding at time of visit. Infant latching on to breast and using supplemental nursing system with EBM and formula.   Recommend feeding at least every 3 hours; follow up with pumping to stimulate. Hand expression demonstrated. Explained benefits of holding baby skin on skin to help promote better breastfeeding outcomes. Will revisit as needed.    Nora Oneal RN, IBCLC

## 2019-06-18 NOTE — PROGRESS NOTES
POSTOP DAY 1 NOTE s/p primary CS for breech at 35w6d     SUBJECTIVE:  Doing well.  Pain controlled so far, though just starting to feel it.  Has only taken Motrin & Tylenol so far so encouraged Oxycodone if gets any worse.  Ambulating to bathroom without dizziness.  Voiding well.  Passing flatus.  Tolerating oral intake.  Working on feeding.  Baby not latching great so far.  Lochia wnl.      OBJECTIVE:  Vitals:    19 0400 19 0500 19 0600 19 0741   BP: 97/57   108/70   Pulse: 83   92   Resp: 16  16 16   Temp:    98.2  F (36.8  C)   TempSrc:    Oral   SpO2: 97% 98% 98% 100%   Weight:       Height:         PHYSICAL EXAM:  A&OX3 NAD  S, mild approp tenderness, nd, ff  INC: c/d/i with bandage  Ext: 1+ edema bilat, no calf tenderness    No lab results found.    Invalid input(s): SODIUM, [id number], CALCIUM    Recent Labs   Lab Test 19  0855 19  0607   HGB 8.9* 11.5*       Patient Active Problem List    Diagnosis Date Noted     Leakage of amniotic fluid 2019     Priority: Medium     Delivery of pregnancy by  section 2019     Priority: Medium     Breech presentation 2019     Priority: Medium     Ruptured, membranes, premature 2019     Priority: Medium     Premature labor 2019     Priority: Medium     S/P  section 2019     Priority: Medium       ASSESSMENT & PLAN:   1. POD# 1.  Doing well overall.  Routine Care.   2. Acute blood loss anemia-pt asymptomatic.  Good UOP overnight.  No need to recheck unless becomes symptomatic or vaginal bleeding becomes more significant.  Home on oral iron.   3. Dispo-anticipate discharge on POD#3.        Electronically signed by MD Elena Lima OB/GYN  Pager: [phone]  [facility]   2019 10:03 AM

## 2019-06-18 NOTE — PLAN OF CARE
Vital signs stable. Postpartum assessment WDL. Passed 1 golf ball size clot overnight. Fundus firm at umbilicus.  Scant flow.  Incision CDI. Pain controlled with toradol and scheduled tylenol. Patient ambulating with stand by assist. Diet advanced to regular. Nausea resolved. Scopolamine patch left ear.  Acharya catheter removed, due to void.  SL. Pumping post breastfeeds.  Breastfeeding on cue with minimal assist. Patient and infant bonding well. Will continue with current plan of care.

## 2019-06-19 PROCEDURE — 25000132 ZZH RX MED GY IP 250 OP 250 PS 637: Performed by: OBSTETRICS & GYNECOLOGY

## 2019-06-19 PROCEDURE — 12000035 ZZH R&B POSTPARTUM

## 2019-06-19 RX ADMIN — ACETAMINOPHEN 975 MG: 325 TABLET, FILM COATED ORAL at 17:02

## 2019-06-19 RX ADMIN — ACETAMINOPHEN 975 MG: 325 TABLET, FILM COATED ORAL at 01:00

## 2019-06-19 RX ADMIN — OXYCODONE HYDROCHLORIDE 5 MG: 5 TABLET ORAL at 03:59

## 2019-06-19 RX ADMIN — ACETAMINOPHEN 975 MG: 325 TABLET, FILM COATED ORAL at 09:45

## 2019-06-19 RX ADMIN — IBUPROFEN 600 MG: 600 TABLET ORAL at 07:03

## 2019-06-19 RX ADMIN — OXYCODONE HYDROCHLORIDE 10 MG: 5 TABLET ORAL at 01:00

## 2019-06-19 RX ADMIN — IBUPROFEN 600 MG: 600 TABLET ORAL at 13:08

## 2019-06-19 RX ADMIN — OXYCODONE HYDROCHLORIDE 5 MG: 5 TABLET ORAL at 13:09

## 2019-06-19 RX ADMIN — OXYCODONE HYDROCHLORIDE 5 MG: 5 TABLET ORAL at 07:04

## 2019-06-19 RX ADMIN — IBUPROFEN 600 MG: 600 TABLET ORAL at 19:39

## 2019-06-19 RX ADMIN — SENNOSIDES AND DOCUSATE SODIUM 2 TABLET: 8.6; 5 TABLET ORAL at 19:38

## 2019-06-19 RX ADMIN — SENNOSIDES AND DOCUSATE SODIUM 1 TABLET: 8.6; 5 TABLET ORAL at 07:38

## 2019-06-19 RX ADMIN — IBUPROFEN 600 MG: 600 TABLET ORAL at 00:02

## 2019-06-19 NOTE — PLAN OF CARE
Pt's pain controlled with Oxycodone/Ibuprofen/Tylenol. Up in room and hallways. Abdominal binder on when out of bed.

## 2019-06-19 NOTE — PROGRESS NOTES
"OB Post-op  Section Progress Note POD# 2    S:  Patient doing well.  Pain well controlled with oral pain medication.  Ambulating.  Tolerating regular diet.  No N/V.  Passing flatus.  Voiding.  Bleeding is normal.  Breastfeeding.    O:  /67   Pulse 93   Temp 98.3  F (36.8  C) (Oral)   Resp 16   Ht 1.55 m (5' 1.02\")   Wt 71 kg (156 lb 8.4 oz)   SpO2 100%   Breastfeeding? Unknown   BMI 29.55 kg/m      Gen- A&O, NAD  Abd- soft, non tender, non distended  Fundus- firm, non tender  Incision- C/D/I, staples intact  Ext- non-tender, minimal edema.     Hemoglobin   Date Value Ref Range Status   2019 8.9 (L) 11.7 - 15.7 g/dL Final     O pos  Rubella immune    A/P:  28 year old  POD# 2 s/p primary LTCS for Breech/SROM/labor. Asymptomatic acute blood loss anemia.    1.  Routine post-op cares  2.  Analgesia adequate  3.  Ambulating   4.  Discharge tomorrow  5.  The plan of care was discussed with the patient.  She expressed understanding and agreement.     Natalya Quan  2019  8:12 AM   "

## 2019-06-19 NOTE — PLAN OF CARE
VSS.  Incision C/D/I. Ambulating independently. Increased PRN oxycodone to 10mg overnight due to incisional pain, with adequate results (see flowsheet).  Working on breastfeeding  and  cares.  Pumping occasionally post breast feeds.  Using lanolin for tender nipples.  Mother present overnight for support.  Will continue to monitor and notify MD as needed.

## 2019-06-19 NOTE — PLAN OF CARE
Patient's vital signs are stable.  She is tolerating diet, ambulating and caring for the baby independently.She complains of  pain  when transferring.  She is taking 5 of oxycodone and may increase to 10.  Incision is clean, dry and intact.  Lochia is WNL.

## 2019-06-20 VITALS
HEART RATE: 93 BPM | BODY MASS INDEX: 29.55 KG/M2 | RESPIRATION RATE: 16 BRPM | HEIGHT: 61 IN | SYSTOLIC BLOOD PRESSURE: 120 MMHG | WEIGHT: 156.53 LBS | OXYGEN SATURATION: 100 % | TEMPERATURE: 98.7 F | DIASTOLIC BLOOD PRESSURE: 83 MMHG

## 2019-06-20 PROCEDURE — 25000132 ZZH RX MED GY IP 250 OP 250 PS 637: Performed by: OBSTETRICS & GYNECOLOGY

## 2019-06-20 RX ORDER — ACETAMINOPHEN 325 MG/1
650 TABLET ORAL EVERY 6 HOURS PRN
Qty: 100 TABLET | Refills: 0 | Status: ON HOLD | OUTPATIENT
Start: 2019-06-20 | End: 2022-04-27

## 2019-06-20 RX ORDER — BREAST PUMP
1 EACH MISCELLANEOUS DAILY PRN
Qty: 1 EACH | Refills: 0 | Status: ON HOLD | OUTPATIENT
Start: 2019-06-20 | End: 2022-04-27

## 2019-06-20 RX ORDER — AMOXICILLIN 250 MG
1 CAPSULE ORAL 2 TIMES DAILY PRN
Qty: 100 TABLET | Refills: 0 | Status: ON HOLD | OUTPATIENT
Start: 2019-06-20 | End: 2022-04-27

## 2019-06-20 RX ORDER — IBUPROFEN 600 MG/1
600 TABLET, FILM COATED ORAL EVERY 6 HOURS PRN
Qty: 100 TABLET | Refills: 0 | Status: ON HOLD | OUTPATIENT
Start: 2019-06-20 | End: 2022-04-27

## 2019-06-20 RX ORDER — OXYCODONE HYDROCHLORIDE 5 MG/1
5-10 TABLET ORAL EVERY 6 HOURS PRN
Qty: 20 TABLET | Refills: 0 | Status: ON HOLD | OUTPATIENT
Start: 2019-06-20 | End: 2022-04-27

## 2019-06-20 RX ADMIN — IBUPROFEN 600 MG: 600 TABLET ORAL at 07:30

## 2019-06-20 RX ADMIN — ACETAMINOPHEN 975 MG: 325 TABLET, FILM COATED ORAL at 01:11

## 2019-06-20 RX ADMIN — OXYCODONE HYDROCHLORIDE 5 MG: 5 TABLET ORAL at 07:34

## 2019-06-20 RX ADMIN — IBUPROFEN 600 MG: 600 TABLET ORAL at 01:11

## 2019-06-20 RX ADMIN — SENNOSIDES AND DOCUSATE SODIUM 1 TABLET: 8.6; 5 TABLET ORAL at 07:30

## 2019-06-20 RX ADMIN — ACETAMINOPHEN 650 MG: 325 TABLET, FILM COATED ORAL at 09:12

## 2019-06-20 NOTE — PLAN OF CARE
Pt's pain controlled with Oxycodone/Ibuprofen/Tylenol. Up and about in room and hallways. Abdominal binder on. Staples removed and steri-strips applied. Going home today with .

## 2019-06-20 NOTE — PROGRESS NOTES
Postpartum progress note  POD#3 s/p primary LTCS for breech/SROM/labor    S:  Doing well.  Pain controlled, using some oxycodone for incisional pain which is well managed.  Ambulating and voiding without difficulty.  Lochia appropriate.  Tolerating a regular diet. Denies N/V. +Flatus.  Lochia is less than menses.  Working on breastfeeding, feels like milk has come in.  Baby in nursery but will be discharged today.  Desires prescription for a breast pump. Desires discharge today.  Reviewed discharge instructions.    O:  Vitals:    06/19/19 0704 06/19/19 0735 06/19/19 1620 06/20/19 0100   BP:  123/75 121/79 117/70   BP Location:  Right arm     Pulse:       Resp: 16 16 18 16   Temp:  98.1  F (36.7  C) 98  F (36.7  C) 98.5  F (36.9  C)   TempSrc:  Oral Oral Oral   SpO2:       Weight:       Height:         Gen: NAD  Resp: No increased work of breathing  Abd: soft, appropriately tender to palpation  Incision: clean, dry, intact  Ext: warm, well-perfused    Hemoglobin   Date Value Ref Range Status   06/18/2019 8.9 (L) 11.7 - 15.7 g/dL Final     A/P: 29 y/o G1 POD#3 s/p primary LTCS doing well postpartum  1. POD#3  -Routine care  -Remove staples prior to discharge    2. Female infant  -In NICU  -Working on breastfeeding/pumping    3. Rh positive/Rubella immune/S/p tdap    4. BCM  -To be discussed at postpartum visit    5. Acute blood loss anemia  -Appropriate hgb after C/S  -Continue PNV  -Asymptomatic    6. Dispo  -discharge home today    \Electronically signed by:  Mary Hugo  [facility]  [facility]  Pager: [phone]  June 20, 2019

## 2019-06-20 NOTE — LACTATION NOTE
Routine and discharge visit.  Mother states feedings are going better.  Infant is LPT.  Mother is pumping after each feeding and Mother and Father are supplementing with similac.  Mother and Father feel comfortable with feedings for home.  Discussed storage of expressed breast milk.  Asking appropriate questions.  No further questions at this time. Reviewed follow up with outpatient lactation consultant in pediatrician clinic.    Catarina Yanez RN, IBCLC

## 2019-06-20 NOTE — PLAN OF CARE
VSS, breastfeeding well and supplementing with expressed breast milk and formula via finger feed, pain well controlled with Tylenol and Ibuprofen, up independently with no complaints of dizziness,  at bedside, interacting and bonding well with infant, encouraged to call with questions or concerns, will continue to monitor.

## 2019-06-20 NOTE — PLAN OF CARE
Patient's vital signs are stable.  She is tolerating diet, ambulating and caring for the baby independently.  Adequate pain control with oral pain medications; during evening shift she has not needed oxycodone. Incision is clean, dry and intact.  Lochia is WNL.   Plan is to discharge tomorrow.  Nurse showed her breastfeeding section of [employer] book.

## 2019-06-20 NOTE — DISCHARGE SUMMARY
[facility] Discharge Summary    Otilia Kuhn MRN# [id number]   Age: 28 year old YOB: 1990     Date of Admission:  2019  Date of Discharge::  2019  Admitting Physician:  Natalya Quan MD  Discharge Physician:  Mary Hugo MD     Home clinic: [facility] (although had not yet established care)          Admission Diagnoses:   Leakage of amniotic fluid  Breech presentation   labor  IUP @ 35+6          Discharge Diagnosis:   S/p primary LTCS          Procedures:   Procedure(s): Primary low transverse  section       No other procedures performed during this admission           Medications Prior to Admission:     Medications Prior to Admission   Medication Sig Dispense Refill Last Dose     ferrous gluconate (FERGON) 324 (38 Fe) MG tablet Take 324 mg by mouth daily (with breakfast)        magnesium 250 MG tablet Take 1 tablet by mouth daily        Prenatal Vit-Fe Fumarate-FA (PRENATAL MULTIVITAMIN W/IRON) 27-0.8 MG tablet Take 1 tablet by mouth daily                Discharge Medications:     Current Discharge Medication List      START taking these medications    Details   acetaminophen (TYLENOL) 325 MG tablet Take 2 tablets (650 mg) by mouth every 6 hours as needed for other (multimodal surgical pain management along with NSAIDS and opioid medication as indicated based on pain control and physical function.)  Qty: 100 tablet, Refills: 0    Associated Diagnoses: S/P  section      ibuprofen (ADVIL/MOTRIN) 600 MG tablet Take 1 tablet (600 mg) by mouth every 6 hours as needed for other (cramping)  Qty: 100 tablet, Refills: 0    Associated Diagnoses: S/P  section      Misc. Devices (BREAST PUMP) MISC 1 each daily as needed  Qty: 1 each, Refills: 0    Associated Diagnoses:  labor in third trimester with  delivery, single or unspecified fetus      oxyCODONE (ROXICODONE) 5 MG tablet Take 1-2 tablets (5-10 mg) by mouth every 6 hours as  needed  Qty: 20 tablet, Refills: 0    Associated Diagnoses: S/P  section      senna-docusate (SENOKOT-S/PERICOLACE) 8.6-50 MG tablet Take 1 tablet by mouth 2 times daily as needed for constipation  Qty: 100 tablet, Refills: 0    Associated Diagnoses: S/P  section         CONTINUE these medications which have NOT CHANGED    Details   ferrous gluconate (FERGON) 324 (38 Fe) MG tablet Take 324 mg by mouth daily (with breakfast)      magnesium 250 MG tablet Take 1 tablet by mouth daily      Prenatal Vit-Fe Fumarate-FA (PRENATAL MULTIVITAMIN W/IRON) 27-0.8 MG tablet Take 1 tablet by mouth daily                   Consultations:   No consultations were requested during this admission          Brief History of Labor or Admission:     Otilia Kuhn is a 29 y/o G1 who presented with SROM and  labor with fetus in the breech presentation.  She therefore underwent primary  section with no complications.  Please see operative note for details.           Hospital Course:   The patient's hospital course was unremarkable.  She recovered as anticipated and experienced no post-operative complications.  On discharge, her pain was well controlled.  Vaginal bleeding is similar to peak menstrual flow.  Voiding without difficulty.  Ambulating well and tolerating a normal diet.  No fever or significant wound drainage.  Breastfeeding/bottlefeeding  infant well.  Infant is stable and will also go home.  No bowel movement yet.  She was discharged on post-partum day #3.  She will be recommended to continue her oral iron in addition to her PNV.    Post-partum hemoglobin:   Hemoglobin   Date Value Ref Range Status   2019 8.9 (L) 11.7 - 15.7 g/dL Final             Discharge Instructions and Follow-Up:   Discharge diet: Regular   Discharge activity: No lifting, driving, or strenuous exercise for 6 week(s)  Pelvic rest: abstain from intercourse and do not use tampons for 6 week(s)   Discharge follow-up:  Follow up with [facility] physician in 2 and 6 weeks   Wound care: Keep wound clean and dry, steri strips off in 7 days           Discharge Disposition:   Discharged to home      Attestation:  I have reviewed today's vital signs, notes, medications, labs and imaging.  Amount of time performed on this discharge: 20 minutes.    Mary Hugo MD   Electronically signed by:  Mary Hugo  [facility] Office  Pager: [phone]  June 20, 2019

## 2019-07-02 ENCOUNTER — DOCUMENTATION ONLY (OUTPATIENT)
Dept: CARE COORDINATION | Facility: CLINIC | Age: 29
End: 2019-07-02

## 2019-07-02 NOTE — PROGRESS NOTES
[facility] will be sharing updates with you on Maternal Child Health Referral requests for home care services.  This is for care coordination purposes and alert you to referral status.  We received the referral for  Otilia Kuhn; MRN [id number] and want to update you:    [facility] is unable to see patient for postpartum/  assessment and education due to patient insurance not contracted with [facility] for this service. [facility].  Patient advised to contact their insurance provider to determine if service is covered through another homecare agency. Offered option of private pay nurse assessment and education for mom or baby at service rate of 150.00 per visit or 180.00 for both.  Provided call back information if private pay visit is requested.  LEFT VOICEMAIL AND TEXT.    Sincerely SUDHA Mac  [phone]

## 2022-03-09 LAB
HEPATITIS B SURFACE ANTIGEN (EXTERNAL): NEGATIVE
HIV1+2 AB SERPL QL IA: NONREACTIVE
RUBELLA ANTIBODY IGG (EXTERNAL): NORMAL
TREPONEMA PALLIDUM ANTIBODY (EXTERNAL): NONREACTIVE

## 2022-04-05 LAB — GROUP B STREPTOCOCCUS (EXTERNAL): NEGATIVE

## 2022-04-06 DIAGNOSIS — Z11.59 ENCOUNTER FOR SCREENING FOR OTHER VIRAL DISEASES: Primary | ICD-10-CM

## 2022-04-23 ENCOUNTER — LAB (OUTPATIENT)
Dept: URGENT CARE | Facility: URGENT CARE | Age: 32
End: 2022-04-23
Attending: SPECIALIST
Payer: COMMERCIAL

## 2022-04-23 DIAGNOSIS — Z11.59 ENCOUNTER FOR SCREENING FOR OTHER VIRAL DISEASES: ICD-10-CM

## 2022-04-23 PROCEDURE — U0003 INFECTIOUS AGENT DETECTION BY NUCLEIC ACID (DNA OR RNA); SEVERE ACUTE RESPIRATORY SYNDROME CORONAVIRUS 2 (SARS-COV-2) (CORONAVIRUS DISEASE [COVID-19]), AMPLIFIED PROBE TECHNIQUE, MAKING USE OF HIGH THROUGHPUT TECHNOLOGIES AS DESCRIBED BY CMS-2020-01-R: HCPCS

## 2022-04-23 PROCEDURE — U0005 INFEC AGEN DETEC AMPLI PROBE: HCPCS

## 2022-04-24 LAB — SARS-COV-2 RNA RESP QL NAA+PROBE: NEGATIVE

## 2022-04-27 ENCOUNTER — HOSPITAL ENCOUNTER (INPATIENT)
Facility: CLINIC | Age: 32
LOS: 2 days | Discharge: HOME | End: 2022-04-29
Attending: SPECIALIST | Admitting: SPECIALIST
Payer: COMMERCIAL

## 2022-04-27 ENCOUNTER — ANESTHESIA EVENT (OUTPATIENT)
Dept: OBGYN | Facility: CLINIC | Age: 32
End: 2022-04-27
Payer: COMMERCIAL

## 2022-04-27 ENCOUNTER — ANESTHESIA (OUTPATIENT)
Dept: OBGYN | Facility: CLINIC | Age: 32
End: 2022-04-27
Payer: COMMERCIAL

## 2022-04-27 DIAGNOSIS — Z98.891 STATUS POST REPEAT LOW TRANSVERSE CESAREAN SECTION: ICD-10-CM

## 2022-04-27 DIAGNOSIS — Z98.891 S/P CESAREAN SECTION: ICD-10-CM

## 2022-04-27 LAB
ABO/RH(D): NORMAL
ANTIBODY SCREEN: NEGATIVE
HGB BLD-MCNC: 11.6 G/DL (ref 11.7–15.7)
SPECIMEN EXPIRATION DATE: NORMAL
T PALLIDUM AB SER QL: NONREACTIVE

## 2022-04-27 PROCEDURE — 258N000003 HC RX IP 258 OP 636: Performed by: NURSE ANESTHETIST, CERTIFIED REGISTERED

## 2022-04-27 PROCEDURE — 120N000012 HC R&B POSTPARTUM

## 2022-04-27 PROCEDURE — 250N000011 HC RX IP 250 OP 636: Performed by: SPECIALIST

## 2022-04-27 PROCEDURE — 258N000003 HC RX IP 258 OP 636: Performed by: SPECIALIST

## 2022-04-27 PROCEDURE — 258N000003 HC RX IP 258 OP 636: Performed by: STUDENT IN AN ORGANIZED HEALTH CARE EDUCATION/TRAINING PROGRAM

## 2022-04-27 PROCEDURE — 86780 TREPONEMA PALLIDUM: CPT | Performed by: SPECIALIST

## 2022-04-27 PROCEDURE — 360N000076 HC SURGERY LEVEL 3, PER MIN: Performed by: SPECIALIST

## 2022-04-27 PROCEDURE — 370N000017 HC ANESTHESIA TECHNICAL FEE, PER MIN: Performed by: SPECIALIST

## 2022-04-27 PROCEDURE — 86901 BLOOD TYPING SEROLOGIC RH(D): CPT | Performed by: SPECIALIST

## 2022-04-27 PROCEDURE — 250N000011 HC RX IP 250 OP 636: Performed by: ANESTHESIOLOGY

## 2022-04-27 PROCEDURE — 250N000009 HC RX 250: Performed by: NURSE ANESTHETIST, CERTIFIED REGISTERED

## 2022-04-27 PROCEDURE — 85018 HEMOGLOBIN: CPT | Performed by: SPECIALIST

## 2022-04-27 PROCEDURE — 250N000013 HC RX MED GY IP 250 OP 250 PS 637: Performed by: SPECIALIST

## 2022-04-27 PROCEDURE — 250N000009 HC RX 250: Performed by: SPECIALIST

## 2022-04-27 PROCEDURE — 250N000011 HC RX IP 250 OP 636: Performed by: NURSE ANESTHETIST, CERTIFIED REGISTERED

## 2022-04-27 PROCEDURE — 250N000013 HC RX MED GY IP 250 OP 250 PS 637

## 2022-04-27 PROCEDURE — 250N000011 HC RX IP 250 OP 636

## 2022-04-27 PROCEDURE — 272N000001 HC OR GENERAL SUPPLY STERILE: Performed by: SPECIALIST

## 2022-04-27 PROCEDURE — 710N000010 HC RECOVERY PHASE 1, LEVEL 2, PER MIN: Performed by: SPECIALIST

## 2022-04-27 RX ORDER — BISACODYL 10 MG
10 SUPPOSITORY, RECTAL RECTAL DAILY PRN
Status: DISCONTINUED | OUTPATIENT
Start: 2022-04-29 | End: 2022-04-29 | Stop reason: HOSPADM

## 2022-04-27 RX ORDER — MISOPROSTOL 200 UG/1
400 TABLET ORAL
Status: DISCONTINUED | OUTPATIENT
Start: 2022-04-27 | End: 2022-04-29 | Stop reason: HOSPADM

## 2022-04-27 RX ORDER — NALOXONE HYDROCHLORIDE 0.4 MG/ML
0.2 INJECTION, SOLUTION INTRAMUSCULAR; INTRAVENOUS; SUBCUTANEOUS
Status: DISCONTINUED | OUTPATIENT
Start: 2022-04-27 | End: 2022-04-29 | Stop reason: HOSPADM

## 2022-04-27 RX ORDER — MISOPROSTOL 200 UG/1
800 TABLET ORAL
Status: DISCONTINUED | OUTPATIENT
Start: 2022-04-27 | End: 2022-04-27

## 2022-04-27 RX ORDER — TRANEXAMIC ACID 10 MG/ML
1 INJECTION, SOLUTION INTRAVENOUS EVERY 30 MIN PRN
Status: DISCONTINUED | OUTPATIENT
Start: 2022-04-27 | End: 2022-04-29 | Stop reason: HOSPADM

## 2022-04-27 RX ORDER — OXYTOCIN 10 [USP'U]/ML
10 INJECTION, SOLUTION INTRAMUSCULAR; INTRAVENOUS
Status: DISCONTINUED | OUTPATIENT
Start: 2022-04-27 | End: 2022-04-29 | Stop reason: HOSPADM

## 2022-04-27 RX ORDER — CARBOPROST TROMETHAMINE 250 UG/ML
250 INJECTION, SOLUTION INTRAMUSCULAR
Status: DISCONTINUED | OUTPATIENT
Start: 2022-04-27 | End: 2022-04-29 | Stop reason: HOSPADM

## 2022-04-27 RX ORDER — METOCLOPRAMIDE 10 MG/1
10 TABLET ORAL EVERY 6 HOURS PRN
Status: DISCONTINUED | OUTPATIENT
Start: 2022-04-27 | End: 2022-04-29 | Stop reason: HOSPADM

## 2022-04-27 RX ORDER — KETOROLAC TROMETHAMINE 30 MG/ML
30 INJECTION, SOLUTION INTRAMUSCULAR; INTRAVENOUS EVERY 6 HOURS
Status: DISCONTINUED | OUTPATIENT
Start: 2022-04-27 | End: 2022-04-27

## 2022-04-27 RX ORDER — ONDANSETRON 2 MG/ML
4 INJECTION INTRAMUSCULAR; INTRAVENOUS EVERY 6 HOURS PRN
Status: DISCONTINUED | OUTPATIENT
Start: 2022-04-27 | End: 2022-04-27

## 2022-04-27 RX ORDER — CARBOPROST TROMETHAMINE 250 UG/ML
250 INJECTION, SOLUTION INTRAMUSCULAR
Status: DISCONTINUED | OUTPATIENT
Start: 2022-04-27 | End: 2022-04-27

## 2022-04-27 RX ORDER — PRENATAL VIT/IRON FUM/FOLIC AC 27MG-0.8MG
1 TABLET ORAL DAILY
Status: DISCONTINUED | OUTPATIENT
Start: 2022-04-27 | End: 2022-04-29 | Stop reason: HOSPADM

## 2022-04-27 RX ORDER — ACETAMINOPHEN 325 MG/1
TABLET ORAL
Status: COMPLETED
Start: 2022-04-27 | End: 2022-04-27

## 2022-04-27 RX ORDER — CEFAZOLIN SODIUM/WATER 2 G/20 ML
SYRINGE (ML) INTRAVENOUS
Status: DISCONTINUED
Start: 2022-04-27 | End: 2022-04-27 | Stop reason: HOSPADM

## 2022-04-27 RX ORDER — SCOLOPAMINE TRANSDERMAL SYSTEM 1 MG/1
1 PATCH, EXTENDED RELEASE TRANSDERMAL
Status: DISCONTINUED | OUTPATIENT
Start: 2022-04-27 | End: 2022-04-29 | Stop reason: HOSPADM

## 2022-04-27 RX ORDER — PROCHLORPERAZINE MALEATE 10 MG
10 TABLET ORAL EVERY 6 HOURS PRN
Status: DISCONTINUED | OUTPATIENT
Start: 2022-04-27 | End: 2022-04-29 | Stop reason: HOSPADM

## 2022-04-27 RX ORDER — ONDANSETRON 4 MG/1
4 TABLET, ORALLY DISINTEGRATING ORAL EVERY 6 HOURS PRN
Status: DISCONTINUED | OUTPATIENT
Start: 2022-04-27 | End: 2022-04-29 | Stop reason: HOSPADM

## 2022-04-27 RX ORDER — ACETAMINOPHEN 325 MG/1
975 TABLET ORAL EVERY 6 HOURS
Status: DISCONTINUED | OUTPATIENT
Start: 2022-04-27 | End: 2022-04-29 | Stop reason: HOSPADM

## 2022-04-27 RX ORDER — METHYLERGONOVINE MALEATE 0.2 MG/ML
200 INJECTION INTRAVENOUS
Status: DISCONTINUED | OUTPATIENT
Start: 2022-04-27 | End: 2022-04-27

## 2022-04-27 RX ORDER — BUPIVACAINE HYDROCHLORIDE 7.5 MG/ML
INJECTION, SOLUTION INTRASPINAL PRN
Status: DISCONTINUED | OUTPATIENT
Start: 2022-04-27 | End: 2022-04-27

## 2022-04-27 RX ORDER — METOCLOPRAMIDE HYDROCHLORIDE 5 MG/ML
10 INJECTION INTRAMUSCULAR; INTRAVENOUS EVERY 6 HOURS PRN
Status: DISCONTINUED | OUTPATIENT
Start: 2022-04-27 | End: 2022-04-29 | Stop reason: HOSPADM

## 2022-04-27 RX ORDER — KETOROLAC TROMETHAMINE 30 MG/ML
30 INJECTION, SOLUTION INTRAMUSCULAR; INTRAVENOUS EVERY 6 HOURS
Status: COMPLETED | OUTPATIENT
Start: 2022-04-27 | End: 2022-04-27

## 2022-04-27 RX ORDER — NALBUPHINE HYDROCHLORIDE 10 MG/ML
2.5-5 INJECTION, SOLUTION INTRAMUSCULAR; INTRAVENOUS; SUBCUTANEOUS EVERY 6 HOURS PRN
Status: DISCONTINUED | OUTPATIENT
Start: 2022-04-27 | End: 2022-04-27

## 2022-04-27 RX ORDER — MORPHINE SULFATE 1 MG/ML
INJECTION, SOLUTION EPIDURAL; INTRATHECAL; INTRAVENOUS PRN
Status: DISCONTINUED | OUTPATIENT
Start: 2022-04-27 | End: 2022-04-27

## 2022-04-27 RX ORDER — CITRIC ACID/SODIUM CITRATE 334-500MG
SOLUTION, ORAL ORAL
Status: DISCONTINUED
Start: 2022-04-27 | End: 2022-04-27 | Stop reason: HOSPADM

## 2022-04-27 RX ORDER — CEFAZOLIN SODIUM/WATER 2 G/20 ML
2 SYRINGE (ML) INTRAVENOUS SEE ADMIN INSTRUCTIONS
Status: DISCONTINUED | OUTPATIENT
Start: 2022-04-27 | End: 2022-04-27

## 2022-04-27 RX ORDER — AMOXICILLIN 250 MG
1 CAPSULE ORAL 2 TIMES DAILY
Status: DISCONTINUED | OUTPATIENT
Start: 2022-04-27 | End: 2022-04-29 | Stop reason: HOSPADM

## 2022-04-27 RX ORDER — OXYTOCIN 10 [USP'U]/ML
10 INJECTION, SOLUTION INTRAMUSCULAR; INTRAVENOUS
Status: DISCONTINUED | OUTPATIENT
Start: 2022-04-27 | End: 2022-04-27

## 2022-04-27 RX ORDER — CITRIC ACID/SODIUM CITRATE 334-500MG
30 SOLUTION, ORAL ORAL
Status: DISCONTINUED | OUTPATIENT
Start: 2022-04-27 | End: 2022-04-27

## 2022-04-27 RX ORDER — TRANEXAMIC ACID 10 MG/ML
1 INJECTION, SOLUTION INTRAVENOUS EVERY 30 MIN PRN
Status: DISCONTINUED | OUTPATIENT
Start: 2022-04-27 | End: 2022-04-27

## 2022-04-27 RX ORDER — OXYTOCIN/0.9 % SODIUM CHLORIDE 30/500 ML
PLASTIC BAG, INJECTION (ML) INTRAVENOUS CONTINUOUS PRN
Status: DISCONTINUED | OUTPATIENT
Start: 2022-04-27 | End: 2022-04-27

## 2022-04-27 RX ORDER — NALBUPHINE HYDROCHLORIDE 10 MG/ML
2.5-5 INJECTION, SOLUTION INTRAMUSCULAR; INTRAVENOUS; SUBCUTANEOUS EVERY 6 HOURS PRN
Status: DISCONTINUED | OUTPATIENT
Start: 2022-04-27 | End: 2022-04-29 | Stop reason: HOSPADM

## 2022-04-27 RX ORDER — SODIUM CHLORIDE, SODIUM LACTATE, POTASSIUM CHLORIDE, CALCIUM CHLORIDE 600; 310; 30; 20 MG/100ML; MG/100ML; MG/100ML; MG/100ML
INJECTION, SOLUTION INTRAVENOUS CONTINUOUS
Status: DISCONTINUED | OUTPATIENT
Start: 2022-04-27 | End: 2022-04-27

## 2022-04-27 RX ORDER — HYDROCORTISONE 2.5 %
CREAM (GRAM) TOPICAL 3 TIMES DAILY PRN
Status: DISCONTINUED | OUTPATIENT
Start: 2022-04-27 | End: 2022-04-29 | Stop reason: HOSPADM

## 2022-04-27 RX ORDER — MODIFIED LANOLIN
OINTMENT (GRAM) TOPICAL
Status: DISCONTINUED | OUTPATIENT
Start: 2022-04-27 | End: 2022-04-29 | Stop reason: HOSPADM

## 2022-04-27 RX ORDER — MISOPROSTOL 200 UG/1
800 TABLET ORAL
Status: DISCONTINUED | OUTPATIENT
Start: 2022-04-27 | End: 2022-04-29 | Stop reason: HOSPADM

## 2022-04-27 RX ORDER — NALOXONE HYDROCHLORIDE 0.4 MG/ML
0.4 INJECTION, SOLUTION INTRAMUSCULAR; INTRAVENOUS; SUBCUTANEOUS
Status: DISCONTINUED | OUTPATIENT
Start: 2022-04-27 | End: 2022-04-29 | Stop reason: HOSPADM

## 2022-04-27 RX ORDER — CEFAZOLIN SODIUM/WATER 2 G/20 ML
2 SYRINGE (ML) INTRAVENOUS
Status: COMPLETED | OUTPATIENT
Start: 2022-04-27 | End: 2022-04-27

## 2022-04-27 RX ORDER — ACETAMINOPHEN 325 MG/1
975 TABLET ORAL ONCE
Status: COMPLETED | OUTPATIENT
Start: 2022-04-27 | End: 2022-04-27

## 2022-04-27 RX ORDER — SIMETHICONE 80 MG
80 TABLET,CHEWABLE ORAL 4 TIMES DAILY PRN
Status: DISCONTINUED | OUTPATIENT
Start: 2022-04-27 | End: 2022-04-29 | Stop reason: HOSPADM

## 2022-04-27 RX ORDER — PROCHLORPERAZINE 25 MG
25 SUPPOSITORY, RECTAL RECTAL EVERY 12 HOURS PRN
Status: DISCONTINUED | OUTPATIENT
Start: 2022-04-27 | End: 2022-04-29 | Stop reason: HOSPADM

## 2022-04-27 RX ORDER — ONDANSETRON 2 MG/ML
INJECTION INTRAMUSCULAR; INTRAVENOUS PRN
Status: DISCONTINUED | OUTPATIENT
Start: 2022-04-27 | End: 2022-04-27

## 2022-04-27 RX ORDER — IBUPROFEN 400 MG/1
800 TABLET, FILM COATED ORAL EVERY 6 HOURS
Status: DISCONTINUED | OUTPATIENT
Start: 2022-04-28 | End: 2022-04-29 | Stop reason: HOSPADM

## 2022-04-27 RX ORDER — ONDANSETRON 4 MG/1
4 TABLET, ORALLY DISINTEGRATING ORAL EVERY 6 HOURS PRN
Status: DISCONTINUED | OUTPATIENT
Start: 2022-04-27 | End: 2022-04-27

## 2022-04-27 RX ORDER — OXYTOCIN/0.9 % SODIUM CHLORIDE 30/500 ML
340 PLASTIC BAG, INJECTION (ML) INTRAVENOUS CONTINUOUS PRN
Status: DISCONTINUED | OUTPATIENT
Start: 2022-04-27 | End: 2022-04-29 | Stop reason: HOSPADM

## 2022-04-27 RX ORDER — KETOROLAC TROMETHAMINE 30 MG/ML
INJECTION, SOLUTION INTRAMUSCULAR; INTRAVENOUS
Status: DISCONTINUED
Start: 2022-04-27 | End: 2022-04-27

## 2022-04-27 RX ORDER — OXYTOCIN/0.9 % SODIUM CHLORIDE 30/500 ML
100-340 PLASTIC BAG, INJECTION (ML) INTRAVENOUS CONTINUOUS PRN
Status: DISCONTINUED | OUTPATIENT
Start: 2022-04-27 | End: 2022-04-27

## 2022-04-27 RX ORDER — ONDANSETRON 2 MG/ML
4 INJECTION INTRAMUSCULAR; INTRAVENOUS EVERY 6 HOURS PRN
Status: DISCONTINUED | OUTPATIENT
Start: 2022-04-27 | End: 2022-04-29 | Stop reason: HOSPADM

## 2022-04-27 RX ORDER — AMOXICILLIN 250 MG
2 CAPSULE ORAL 2 TIMES DAILY
Status: DISCONTINUED | OUTPATIENT
Start: 2022-04-27 | End: 2022-04-29 | Stop reason: HOSPADM

## 2022-04-27 RX ORDER — OXYTOCIN/0.9 % SODIUM CHLORIDE 30/500 ML
340 PLASTIC BAG, INJECTION (ML) INTRAVENOUS CONTINUOUS PRN
Status: DISCONTINUED | OUTPATIENT
Start: 2022-04-27 | End: 2022-04-27

## 2022-04-27 RX ORDER — SCOLOPAMINE TRANSDERMAL SYSTEM 1 MG/1
1 PATCH, EXTENDED RELEASE TRANSDERMAL
Status: DISCONTINUED | OUTPATIENT
Start: 2022-04-27 | End: 2022-04-27

## 2022-04-27 RX ORDER — DEXTROSE, SODIUM CHLORIDE, SODIUM LACTATE, POTASSIUM CHLORIDE, AND CALCIUM CHLORIDE 5; .6; .31; .03; .02 G/100ML; G/100ML; G/100ML; G/100ML; G/100ML
INJECTION, SOLUTION INTRAVENOUS CONTINUOUS
Status: DISCONTINUED | OUTPATIENT
Start: 2022-04-27 | End: 2022-04-29 | Stop reason: HOSPADM

## 2022-04-27 RX ORDER — LIDOCAINE 40 MG/G
CREAM TOPICAL
Status: DISCONTINUED | OUTPATIENT
Start: 2022-04-27 | End: 2022-04-29 | Stop reason: HOSPADM

## 2022-04-27 RX ORDER — LIDOCAINE 40 MG/G
CREAM TOPICAL
Status: DISCONTINUED | OUTPATIENT
Start: 2022-04-27 | End: 2022-04-27

## 2022-04-27 RX ORDER — METHYLERGONOVINE MALEATE 0.2 MG/ML
200 INJECTION INTRAVENOUS
Status: DISCONTINUED | OUTPATIENT
Start: 2022-04-27 | End: 2022-04-29 | Stop reason: HOSPADM

## 2022-04-27 RX ORDER — OXYCODONE HYDROCHLORIDE 5 MG/1
5 TABLET ORAL EVERY 4 HOURS PRN
Status: DISCONTINUED | OUTPATIENT
Start: 2022-04-27 | End: 2022-04-29 | Stop reason: HOSPADM

## 2022-04-27 RX ORDER — MISOPROSTOL 200 UG/1
400 TABLET ORAL
Status: DISCONTINUED | OUTPATIENT
Start: 2022-04-27 | End: 2022-04-27

## 2022-04-27 RX ADMIN — KETOROLAC TROMETHAMINE 30 MG: 30 INJECTION, SOLUTION INTRAMUSCULAR; INTRAVENOUS at 09:44

## 2022-04-27 RX ADMIN — MORPHINE SULFATE 0.2 MG: 1 INJECTION, SOLUTION EPIDURAL; INTRATHECAL; INTRAVENOUS at 07:26

## 2022-04-27 RX ADMIN — METOCLOPRAMIDE 10 MG: 5 INJECTION, SOLUTION INTRAMUSCULAR; INTRAVENOUS at 11:49

## 2022-04-27 RX ADMIN — ACETAMINOPHEN 975 MG: 325 TABLET ORAL at 12:29

## 2022-04-27 RX ADMIN — PHENYLEPHRINE HYDROCHLORIDE 0.5 MCG/KG/MIN: 10 INJECTION INTRAVENOUS at 07:25

## 2022-04-27 RX ADMIN — ACETAMINOPHEN 975 MG: 325 TABLET ORAL at 06:24

## 2022-04-27 RX ADMIN — PHENYLEPHRINE HYDROCHLORIDE 100 MCG: 10 INJECTION INTRAVENOUS at 07:29

## 2022-04-27 RX ADMIN — KETOROLAC TROMETHAMINE 30 MG: 30 INJECTION, SOLUTION INTRAMUSCULAR; INTRAVENOUS at 16:33

## 2022-04-27 RX ADMIN — ACETAMINOPHEN 975 MG: 325 TABLET ORAL at 18:06

## 2022-04-27 RX ADMIN — NALBUPHINE HYDROCHLORIDE 2.5 MG: 10 INJECTION, SOLUTION INTRAMUSCULAR; INTRAVENOUS; SUBCUTANEOUS at 10:24

## 2022-04-27 RX ADMIN — BUPIVACAINE HYDROCHLORIDE IN DEXTROSE 12 MG: 7.5 INJECTION, SOLUTION SUBARACHNOID at 07:26

## 2022-04-27 RX ADMIN — SODIUM CHLORIDE, SODIUM LACTATE, POTASSIUM CHLORIDE, CALCIUM CHLORIDE AND DEXTROSE MONOHYDRATE: 5; 600; 310; 30; 20 INJECTION, SOLUTION INTRAVENOUS at 12:12

## 2022-04-27 RX ADMIN — KETOROLAC TROMETHAMINE 30 MG: 30 INJECTION, SOLUTION INTRAMUSCULAR; INTRAVENOUS at 22:56

## 2022-04-27 RX ADMIN — Medication 340 ML/HR: at 07:52

## 2022-04-27 RX ADMIN — ONDANSETRON 4 MG: 2 INJECTION INTRAMUSCULAR; INTRAVENOUS at 14:41

## 2022-04-27 RX ADMIN — SIMETHICONE 80 MG: 80 TABLET, CHEWABLE ORAL at 15:46

## 2022-04-27 RX ADMIN — SCOPALAMINE 1 PATCH: 1 PATCH, EXTENDED RELEASE TRANSDERMAL at 10:22

## 2022-04-27 RX ADMIN — SENNOSIDES AND DOCUSATE SODIUM 1 TABLET: 50; 8.6 TABLET ORAL at 22:56

## 2022-04-27 RX ADMIN — SODIUM CHLORIDE, POTASSIUM CHLORIDE, SODIUM LACTATE AND CALCIUM CHLORIDE: 600; 310; 30; 20 INJECTION, SOLUTION INTRAVENOUS at 05:58

## 2022-04-27 RX ADMIN — ONDANSETRON 4 MG: 2 INJECTION INTRAMUSCULAR; INTRAVENOUS at 07:52

## 2022-04-27 RX ADMIN — Medication 2 G: at 07:14

## 2022-04-27 RX ADMIN — SODIUM CHLORIDE, POTASSIUM CHLORIDE, SODIUM LACTATE AND CALCIUM CHLORIDE 500 ML: 600; 310; 30; 20 INJECTION, SOLUTION INTRAVENOUS at 18:03

## 2022-04-27 RX ADMIN — SODIUM CHLORIDE, POTASSIUM CHLORIDE, SODIUM LACTATE AND CALCIUM CHLORIDE: 600; 310; 30; 20 INJECTION, SOLUTION INTRAVENOUS at 07:53

## 2022-04-27 ASSESSMENT — ACTIVITIES OF DAILY LIVING (ADL)
ADLS_ACUITY_SCORE: 5
ADLS_ACUITY_SCORE: 3
ADLS_ACUITY_SCORE: 5
ADLS_ACUITY_SCORE: 3
ADLS_ACUITY_SCORE: 5
ADLS_ACUITY_SCORE: 3
ADLS_ACUITY_SCORE: 3
ADLS_ACUITY_SCORE: 5
ADLS_ACUITY_SCORE: 3
ADLS_ACUITY_SCORE: 3
ADLS_ACUITY_SCORE: 5
ADLS_ACUITY_SCORE: 3
ADLS_ACUITY_SCORE: 3

## 2022-04-27 ASSESSMENT — LIFESTYLE VARIABLES: TOBACCO_USE: 0

## 2022-04-27 ASSESSMENT — ENCOUNTER SYMPTOMS: SEIZURES: 0

## 2022-04-27 NOTE — PLAN OF CARE
Patient transferred from PACU at 1000. Report taken from Juany [facility]. Safety and security, and education reviewed. Baby bands checked. VSS. Fundus firm and midline. Light flow. Encouraged to call with needs, questions, or concerns. Will continue to monitor.

## 2022-04-27 NOTE — PLAN OF CARE
Vital signs stable. Postpartum assessment WDL. Incision dressing marked  Pain controlled with tylenol and Toradol pt nauseated and vomiting all shift  Reglan and  Zofran given scopolamine patch applied on left ear started to Tolerate clear liquid now  Nubain given for itching Patient up with assist to bedside very dizzy back to bed feel better  gill draining  urine output 250 ml MD notified LR bolus given  Patient  passing gas minimal mylicon given   Breastfeeding on cue with  assist. Patient and infant bonding well. Will continue with current plan of care.

## 2022-04-27 NOTE — ANESTHESIA CARE TRANSFER NOTE
Patient: Otilia Kuhn    Procedure: Procedure(s):  repeat  SECTION       Diagnosis: Previous  section [Z98.891]  Diagnosis Additional Information: No value filed.    Anesthesia Type:   Spinal     Note:    Oropharynx: oropharynx clear of all foreign objects  Level of Consciousness: awake  Oxygen Supplementation: room air    Independent Airway: airway patency satisfactory and stable  Dentition: dentition unchanged  Vital Signs Stable: post-procedure vital signs reviewed and stable  Report to RN Given: handoff report given  Patient transferred to: PACU    Handoff Report: Identifed the Patient, Identified the Reponsible Provider, Reviewed the pertinent medical history, Discussed the surgical course, Reviewed Intra-OP anesthesia mangement and issues during anesthesia, Set expectations for post-procedure period and Allowed opportunity for questions and acknowledgement of understanding      Vitals:  Vitals Value Taken Time   /60    Temp     Pulse 77    Resp 20    SpO2 99        Electronically Signed By: RADHA March CRNA  2022  8:48 AM

## 2022-04-27 NOTE — ANESTHESIA PREPROCEDURE EVALUATION
Anesthesia Pre-Procedure Evaluation    Patient: Otilia Kuhn   MRN: [id number] : 1990        Procedure : Procedure(s):  repeat  SECTION          Past Medical History:   Diagnosis Date     Anxiety      History of cold sores       Past Surgical History:   Procedure Laterality Date      SECTION N/A 2019    Procedure:  SECTION;  Surgeon: Natalya Quna MD;  Location:  L+D      Allergies   Allergen Reactions     Penicillins Rash      Social History     Tobacco Use     Smoking status: Never Smoker     Smokeless tobacco: Never Used   Substance Use Topics     Alcohol use: Not Currently      Wt Readings from Last 1 Encounters:   22 69.9 kg (154 lb)        Anesthesia Evaluation   Pt has had prior anesthetic.     No history of anesthetic complications       ROS/MED HX  ENT/Pulmonary:    (-) tobacco use and sleep apnea   Neurologic:    (-) no seizures and no CVA   Cardiovascular:    (-) hypertension   METS/Exercise Tolerance:     Hematologic:       Musculoskeletal:       GI/Hepatic:    (-) GERD and liver disease   Renal/Genitourinary:    (-) renal disease   Endo:    (-) Type II DM and thyroid disease   Psychiatric/Substance Use:     (+) psychiatric history anxiety     Infectious Disease:       Malignancy:       Other:            Physical Exam    Airway        Mallampati: II   TM distance: > 3 FB   Neck ROM: full   Mouth opening: > 3 cm    Respiratory Devices and Support         Dental  no notable dental history         Cardiovascular   cardiovascular exam normal          Pulmonary   pulmonary exam normal                OUTSIDE LABS:  CBC:   Lab Results   Component Value Date    HGB 11.6 (L) 2022    HGB 8.9 (L) 2019     BMP: No results found for: NA, POTASSIUM, CHLORIDE, CO2, BUN, CR, GLC  COAGS: No results found for: PTT, INR, FIBR  POC: No results found for: BGM, HCG, HCGS  HEPATIC: No results found for: ALBUMIN, PROTTOTAL, ALT, AST, GGT, ALKPHOS, BILITOTAL,  BILIDIRECT, HAYLEY  OTHER: No results found for: PH, LACT, A1C, SACHIN, PHOS, MAG, LIPASE, AMYLASE, TSH, T4, T3, CRP, SED    Anesthesia Plan    ASA Status:  2   NPO Status:  NPO Appropriate    Anesthesia Type: Spinal.              Consents    Anesthesia Plan(s) and associated risks, benefits, and realistic alternatives discussed. Questions answered and patient/representative(s) expressed understanding.    - Discussed:     - Discussed with:  Patient, Spouse         Postoperative Care    Pain management: Multi-modal analgesia.   PONV prophylaxis: Ondansetron (or other 5HT-3)     Comments:                Jignesh Galarza MD

## 2022-04-27 NOTE — PLAN OF CARE
Data: Otilia Kuhn transferred to 0955 via bed at 1000. Baby transferred via parent's arms.  Action: Receiving unit notified of transfer: Yes. Patient and family notified of room change. Report given to ABHIJEET Kwok at 1000. Belongings sent to receiving unit. Accompanied by Registered Nurse. Oriented patient to surroundings. Call light within reach. ID bands double-checked with receiving RN.  Response: Patient tolerated transfer and is stable.    .

## 2022-04-27 NOTE — L&D DELIVERY NOTE
[facility] Obstetrics Brief Operative Note    Pre-operative diagnosis: Intrauterine pregnancy at 39 weeks  History of primary  section, desires repeat   Post-operative diagnosis: Same   Procedure: Repeat low transverse  section   Surgeon: Mary Bowens MD   Assistant(s): None   Anesthesia: Spinal anesthesia   Estimated blood loss: 575cc   Total IV fluids: (See anesthesia record)   Blood transfusion: No transfusion was given during surgery   Total urine output: (See anesthesia record)  60cc   Drains: None   Specimens: none   Findings: Viable female, wt 6#12oz, APGARS: 1 minute: 8   5 minute: 9, normal pelvic anatomy (right ovary not visualized)   Complications: None   Condition: Mother stable, transfered to post-anesthesia recovery   Comments: See dictated operative report for full details

## 2022-04-27 NOTE — ANESTHESIA PROCEDURE NOTES
Intrathecal Procedure Note    Pre-Procedure   Staff -        Anesthesiologist:  Jignesh Galarza MD       Performed By: anesthesiologist       Location: OR       Pre-Anesthestic Checklist: patient identified, IV checked, site marked, risks and benefits discussed, informed consent, monitors and equipment checked, pre-op evaluation, at physician/surgeon's request and post-op pain management  Timeout:       Correct Patient: Yes        Correct Procedure: Yes        Correct Site: Yes        Correct Position: Yes   Procedure Documentation  Procedure: intrathecal       Patient Position: sitting       Patient Prep/Sterile Barriers: sterile gloves, mask, patient draped       Skin prep: Betadine       Insertion Site: L3-4. (midline approach).       Needle Gauge: 24.        Needle Length (Inches): 4        Spinal Needle Type: Pencan       Introducer used       # of attempts: 1 and  # of redirects:     Assessment/Narrative         Paresthesias: No.       CSF fluid: clear.     Comments:  No complications

## 2022-04-27 NOTE — OP NOTE
Procedure Date: 2022    PREOPERATIVE DIAGNOSES:     1.  Intrauterine pregnancy at 39 weeks' gestation.  2.  History of prior  section, desires repeat.    POSTOPERATIVE DIAGNOSES:  1.  Intrauterine pregnancy at 39 weeks' gestation.  2.  History of prior  section, desires repeat.  3.  Delivered.    PROCEDURE:  Repeat low transverse  section.    ANESTHESIA:  Spinal.    SURGEON:  Mary Bowens MD    INDICATION:  This is a 31-year-old  2, para 0-1-0-1 female with a history of a prior  section for breech at 35 weeks 6 days following rupture of membranes.  The patient's current pregnancy has been uncomplicated, although she spent the first 2 trimesters in [facility].  Her pregnancy care with me has been uncomplicated, and she is requesting a repeat  section, declines trial of labor.  She also declines tubal ligation.    OPERATIVE FINDINGS:  Laparotomy revealed minimal abdominal wall adhesions and some bladder adhesions to the lower uterine segment.  There was delivery of a viable female infant in vertex presentation, weighing 6 pounds 12 ounces with Apgars 8 and 9 at 1 and 5 minutes respectively.    OPERATIVE PROCEDURE IN DETAIL:  After informed consent was obtained, the patient was taken to the operating room where a spinal anesthetic was administered.  She was then placed in the left lateral tilt position.  Fetal heart tones were noted to be within normal limits.  Her abdomen was prepped and draped in standard sterile fashion.  She received 2 grams of Ancef prior to skin incision and sequential compression devices in place and running throughout the case.  She had a Acharya catheter inserted in the bladder under sterile conditions.    After a timeout occurred, a Pfannenstiel skin incision was made through the preexisting scar and carried down to the level of the fascia using the Bovie.  The fascia was then nicked in the midline.  Incision was extended transversely  using the Banks scissors.  The fascia was then dissected off the rectus muscles using sharp and blunt dissection.  Rectus muscles were noted to be fused in the midline.  The midline was then found and entered sharply.  The incision was then extended in a cephalad and caudad direction, careful to avoid the bladder.  Bladder was noted to be adherent to the lower uterine segment and was sharply dissected away.  The bladder blade was then placed.    A uterine incision was then made sharply and extended laterally, bluntly.  There was rupture of membranes for clear fluid, moderate amount.  The umbilical cord presented through the incision.  The vertex was easily elevated and delivered through the incision.  There was no nuchal cord noted.  The shoulders delivered easily.  The infant cried immediately upon delivery, and delayed cord clamping of 1 minute was accomplished.    The infant's cord was then doubly clamped and cut, and she was handed off to awaiting nursing personnel, noted to be vigorous.  The placenta was then removed and the uterus explored with good tone noted and no apparent retained placental tissue.    The abdomen was then cleared of clot and the incision inspected with no significant extensions noted.  The uterine incision was then closed using a 2-layer closure of 0 Vicryl suture.  It was repaired in situ.  The abdomen was then irrigated with warm normal saline and the left ovary visualized and appeared normal.  The right ovary was not visualized, but there were no palpable masses noted.  Once hemostasis was assured, the peritoneum was then identified and closed using a running 2-0 Vicryl suture.  The patient was quite nauseated during this portion.  The rectus muscles were then inspected and hemostasis assured.  Some Surgicel powder was placed, both on the uterine serosa where the adhesions were dissected previously, as well as on the anterior abdominal wall for some slight oozing.  No significant  bleeding was noted.    Fascia was then identified and closed using running continuous 0 Vicryl suture.  Subcutaneous layer was irrigated and noted to be hemostatic.  Several interrupted sutures of 3-0 Monocryl were placed to reapproximate the subcutaneous space.  The skin was then closed using a running 4-0 Monocryl suture in subcuticular fashion.  Steri-Strips were applied followed by sterile dressing.    There were no complications.      QUANTITATIVE BLOOD LOSS:  575 mL.    URINE OUTPUT:  Only 60 mL of clear urine per Acharya catheter.     The patient was comfortable throughout, but was nauseated during the second half of the surgery.  She was taken to the recovery area in satisfactory condition.  Baby is doing well and admitted to  Nursery.    Mary Bowens MD        D: 2022   T: 2022   MT: DANIELA    Name:     DANTE FELICIANODex  MRN:      [id number]        Account:        [id number]   :      1990           Procedure Date: 2022     Document: [id number]

## 2022-04-27 NOTE — LACTATION NOTE
Attempted visit. Otilia was nauseous and vomiting. Will revisit as Otilia feels better.    Sirisha Russell RN IBCLC

## 2022-04-27 NOTE — PROVIDER NOTIFICATION
Ev James is doing well with introduction of foods per [facility] protocol  He was admitted this month and had an IV placed for introduction to wheat  He did well over the 1st 24 hours and has continued eating wheat crackers at home without difficulty  We would like you to continue following their protocol with introduction of grains  We are happy that he is allowed dairy now and we suggest that you offer 1 -4 oz serving per day  We would like you to continue breast-feeding twice a day  We would ask that he limit his juice to 12 oz daily and begin to add water to the juice serving  Please continue water multiple times through the day  His growth his excellent as he has achieved 63% for height and 54% for weight  We would like to see him back in 4 months for reassessment  error

## 2022-04-27 NOTE — ANESTHESIA POSTPROCEDURE EVALUATION
Patient: Otilia Kuhn    Procedure: Procedure(s):  repeat  SECTION       Anesthesia Type:  Spinal    Note:     Postop Pain Control: Uneventful            Sign Out: Well controlled pain   PONV: No   Neuro/Psych: Uneventful            Sign Out: Acceptable/Baseline neuro status   Airway/Respiratory: Uneventful            Sign Out: Acceptable/Baseline resp. status   CV/Hemodynamics: Uneventful            Sign Out: Acceptable CV status; No obvious hypovolemia; No obvious fluid overload   Other NRE: NONE   DID A NON-ROUTINE EVENT OCCUR? No           Last vitals:  Vitals Value Taken Time   /70 22 0945   Temp 36.6  C (97.8  F) 22 0945   Pulse     Resp 16 22 0945   SpO2 98 % 22 0945       Electronically Signed By: Jignesh Galarza MD  2022  12:45 PM

## 2022-04-27 NOTE — INTERVAL H&P NOTE
"I have reviewed the surgical (or preoperative) H&P that is linked to this encounter, and examined the patient. There are no significant changes    Clinical Conditions Present on Arrival:  Clinically Significant Risk Factors Present on Admission                   # Overweight: Estimated body mass index is 29.1 kg/m  as calculated from the following:    Height as of this encounter: 1.549 m (5' 1\").    Weight as of this encounter: 69.9 kg (154 lb).       "

## 2022-04-27 NOTE — PROVIDER NOTIFICATION
04/27/22 1800   Provider Notification   Provider Name/Title Dr Orantes   Method of Notification Phone   Request Evaluate-Remote   Notification Reason Status Update   MD notified with Pt Urine output 250 ml and keeping gill till tomorrow order given in Epic

## 2022-04-27 NOTE — H&P
OB Admit    30 yo  female currently at 39 weeks EDC 22 based on LMP and transfer records who presents for repeat  section.  She has been counseled and offered trial of labor and declines.  She also declines tubal ligation or sterilization.  Patient had ultrasound in our office at 33 weeks with growth within 3 days of stated LMP dating, showing anterior placenta and limited normal anatomy scan.  Having a girl!    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  Family is in [place].  Former smoker - not current.  Denies any ETOH or drug use.  ROS  Comprehensive ROS reviewed and negative unless otherwise stated.  Recent URI symptoms have resolved completely.  Vaginal symptoms have also resolved.    PE  See vitals  Gen alert NAD  CV RRR  Lungs clear bilat  Abd soft/gravid/NT/FH appropriate/EFW 7#  Extr neg    FHT WNL  SVE def    Imp: 30 yo  female currently at 39 weeks presents for repeat  section.  She declines trial of labor.  She declines tubal ligation.    Plan: She is aware to be NPO.  Plan IV placement, routine labs upon arrival.  Postoperative care and length of stay reviewed.  Informed consent will be obtained the morning of the procedure.  Will confirm presentation with ultrasound.    Electronically signed by Mary Bowens MD  [facility]

## 2022-04-28 LAB
HGB BLD-MCNC: 8.2 G/DL (ref 11.7–15.7)
HGB BLD-MCNC: 8.7 G/DL (ref 11.7–15.7)

## 2022-04-28 PROCEDURE — 85018 HEMOGLOBIN: CPT | Performed by: SPECIALIST

## 2022-04-28 PROCEDURE — 120N000012 HC R&B POSTPARTUM

## 2022-04-28 PROCEDURE — 250N000013 HC RX MED GY IP 250 OP 250 PS 637: Performed by: SPECIALIST

## 2022-04-28 PROCEDURE — 36415 COLL VENOUS BLD VENIPUNCTURE: CPT | Performed by: SPECIALIST

## 2022-04-28 PROCEDURE — 36415 COLL VENOUS BLD VENIPUNCTURE: CPT | Performed by: OBSTETRICS & GYNECOLOGY

## 2022-04-28 PROCEDURE — 85018 HEMOGLOBIN: CPT | Performed by: OBSTETRICS & GYNECOLOGY

## 2022-04-28 PROCEDURE — 250N000011 HC RX IP 250 OP 636: Performed by: OBSTETRICS & GYNECOLOGY

## 2022-04-28 PROCEDURE — 258N000003 HC RX IP 258 OP 636: Performed by: OBSTETRICS & GYNECOLOGY

## 2022-04-28 RX ORDER — METHYLPREDNISOLONE SODIUM SUCCINATE 125 MG/2ML
125 INJECTION, POWDER, LYOPHILIZED, FOR SOLUTION INTRAMUSCULAR; INTRAVENOUS
Status: DISCONTINUED | OUTPATIENT
Start: 2022-04-28 | End: 2022-04-29 | Stop reason: HOSPADM

## 2022-04-28 RX ORDER — DIPHENHYDRAMINE HYDROCHLORIDE 50 MG/ML
50 INJECTION INTRAMUSCULAR; INTRAVENOUS
Status: DISCONTINUED | OUTPATIENT
Start: 2022-04-28 | End: 2022-04-29 | Stop reason: HOSPADM

## 2022-04-28 RX ADMIN — OXYCODONE HYDROCHLORIDE 5 MG: 5 TABLET ORAL at 15:59

## 2022-04-28 RX ADMIN — ACETAMINOPHEN 975 MG: 325 TABLET ORAL at 09:45

## 2022-04-28 RX ADMIN — IBUPROFEN 800 MG: 400 TABLET ORAL at 11:04

## 2022-04-28 RX ADMIN — SENNOSIDES AND DOCUSATE SODIUM 1 TABLET: 50; 8.6 TABLET ORAL at 08:28

## 2022-04-28 RX ADMIN — IBUPROFEN 800 MG: 400 TABLET ORAL at 23:48

## 2022-04-28 RX ADMIN — SENNOSIDES AND DOCUSATE SODIUM 1 TABLET: 50; 8.6 TABLET ORAL at 21:40

## 2022-04-28 RX ADMIN — SIMETHICONE 80 MG: 80 TABLET, CHEWABLE ORAL at 15:56

## 2022-04-28 RX ADMIN — IBUPROFEN 800 MG: 400 TABLET ORAL at 17:40

## 2022-04-28 RX ADMIN — IBUPROFEN 800 MG: 400 TABLET ORAL at 05:14

## 2022-04-28 RX ADMIN — ACETAMINOPHEN 975 MG: 325 TABLET ORAL at 21:40

## 2022-04-28 RX ADMIN — ACETAMINOPHEN 975 MG: 325 TABLET ORAL at 03:35

## 2022-04-28 RX ADMIN — PRENATAL VITAMINS-IRON FUMARATE 27 MG IRON-FOLIC ACID 0.8 MG TABLET 1 TABLET: at 08:29

## 2022-04-28 RX ADMIN — ACETAMINOPHEN 975 MG: 325 TABLET ORAL at 15:46

## 2022-04-28 RX ADMIN — IRON SUCROSE 200 MG: 20 INJECTION, SOLUTION INTRAVENOUS at 11:40

## 2022-04-28 ASSESSMENT — ACTIVITIES OF DAILY LIVING (ADL)
ADLS_ACUITY_SCORE: 5
ADLS_ACUITY_SCORE: 3
ADLS_ACUITY_SCORE: 5
ADLS_ACUITY_SCORE: 3
ADLS_ACUITY_SCORE: 5
ADLS_ACUITY_SCORE: 5
ADLS_ACUITY_SCORE: 3

## 2022-04-28 NOTE — PLAN OF CARE
Vital signs stable this shift, afebrile.  No s/s of infection.  Fundus firm, scant rubra lochia.  Acharya removed (per flowsheet) and able to ambulate to bathroom for luis alberto care, new pad, new gown.  No nausea/vomiting this shift.  Pain controlled with medications, per MAR.     Problem: Plan of Care - These are the overarching goals to be used throughout the patient stay.    Goal: Absence of Hospital-Acquired Illness or Injury  Outcome: Met  Intervention: Prevent and Manage VTE (Venous Thromboembolism) Risk  Recent Flowsheet Documentation  Taken 2022 1930 by Ivanna Abdullahi, RN  VTE Prevention/Management: SCDs (sequential compression devices) on     Problem: Adjustment to Role Transition (Postpartum  Delivery)  Goal: Successful Maternal Role Transition  Outcome: Met     Problem: Bleeding (Postpartum  Delivery)  Goal: Hemostasis  Outcome: Met     Problem: Infection (Postpartum  Delivery)  Goal: Absence of Infection Signs and Symptoms  Outcome: Met     Problem: Pain (Postpartum  Delivery)  Goal: Acceptable Pain Control  Outcome: Met  Intervention: Prevent or Manage Pain  Recent Flowsheet Documentation  Taken 2022 0335 by Ivanna Abdullahi, RN  Pain Management Interventions: medication (see MAR)  Taken 2022 2256 by Ivanna Abdullahi, RN  Pain Management Interventions: medication (see MAR)     Problem: Postoperative Nausea and Vomiting (Postpartum  Delivery)  Goal: Nausea and Vomiting Relief  Outcome: Met

## 2022-04-28 NOTE — PROGRESS NOTES
[facility] Obstetrics Post-Op Progress Note    Subjective:   Patient feels well today. She is tolerating a general diet without nausea or vomiting. Patient required additional IVF yesterday for low UOP, but now that has resolved and patient is now voiding spontaneously. Patient is passing flatus. She has not had a bowel movement. Patient is breastfeeding. Her mood is appropriate.             Physical Exam:     Patient Vitals for the past 24 hrs:   BP Temp Temp src Pulse Resp SpO2   22 0900 97/58 97.9  F (36.6  C) Oral 70 17 --   22 0335 94/52 98.3  F (36.8  C) Oral -- 16 99 %   22 2330 103/61 98.6  F (37  C) Oral -- 16 98 %   22 1930 102/63 98.2  F (36.8  C) Oral -- 16 99 %   22 1530 109/69 97.8  F (36.6  C) Oral 76 16 --   22 1430 115/75 -- -- 68 16 100 %   22 1326 112/67 -- -- 65 16 100 %   22 1230 104/63 -- -- 68 16 100 %   22 1130 113/74 -- -- 79 16 100 %   22 1100 112/70 -- -- 77 16 100 %     General: alert, oriented, in no acute distress   Heart: regular rate   Lungs: breathing comfortably, no sign of respiratory distress, conversational dyspnea, or accessory muscle use   Abdomen: appropriately tender  Incision: clean, dry with bandage covering  LE: limited to no LE edema          Data:   No results found for: RUBELLAABIGG  Lab Results   Component Value Date    ABO O 2019          Lab Results   Component Value Date    RH Pos 2019     Hemoglobin   Date Value Ref Range Status   2022 8.2 (L) 11.7 - 15.7 g/dL Final   2022 11.6 (L) 11.7 - 15.7 g/dL Final   2019 8.9 (L) 11.7 - 15.7 g/dL Final   2019 11.5 (L) 11.7 - 15.7 g/dL Final     Assessment and Plan:    Assessment:   Post-operative day #1  Low transverse repeat  section  L&D complications: Meeting postop milestones so far but labs remarkable for postop anemia   -plan for IV venofer and repeat hgb tonight at 7pm          Plan:   Start iron  supplementation  Continue postop care      Electronically Signed by  Lili Pratt DO  4/28/2022  [facility]  [facility]

## 2022-04-28 NOTE — PLAN OF CARE
Vital signs stable. Postpartum assessment WDL. Hgb 8.2 today IV Venofer  given recheck Hgb at 1900  Incision dressing on will take after shower . Pain controlled with tylenol ,motrin and oxycodone . Patient  up ambulating  voiding without difficulty. Patient passing gas minimal mylicon given working on  Breastfeeding Patient and infant bonding well. Will continue with current plan of care.

## 2022-04-29 VITALS
HEART RATE: 69 BPM | TEMPERATURE: 98.3 F | SYSTOLIC BLOOD PRESSURE: 111 MMHG | RESPIRATION RATE: 18 BRPM | OXYGEN SATURATION: 99 % | DIASTOLIC BLOOD PRESSURE: 71 MMHG | HEIGHT: 61 IN | WEIGHT: 154 LBS | BODY MASS INDEX: 29.07 KG/M2

## 2022-04-29 PROCEDURE — 250N000013 HC RX MED GY IP 250 OP 250 PS 637: Performed by: SPECIALIST

## 2022-04-29 RX ORDER — OXYCODONE HYDROCHLORIDE 5 MG/1
5 TABLET ORAL EVERY 4 HOURS PRN
Qty: 8 TABLET | Refills: 0 | Status: SHIPPED | OUTPATIENT
Start: 2022-04-29

## 2022-04-29 RX ORDER — BREAST PUMP
1 EACH MISCELLANEOUS
Qty: 1 EACH | Refills: 0 | Status: SHIPPED | OUTPATIENT
Start: 2022-04-29

## 2022-04-29 RX ORDER — IBUPROFEN 800 MG/1
800 TABLET, FILM COATED ORAL EVERY 6 HOURS
Qty: 30 TABLET | Refills: 1 | Status: SHIPPED | OUTPATIENT
Start: 2022-04-29

## 2022-04-29 RX ORDER — ACETAMINOPHEN 500 MG
975 TABLET ORAL EVERY 6 HOURS PRN
COMMUNITY
Start: 2022-04-29

## 2022-04-29 RX ADMIN — PRENATAL VITAMINS-IRON FUMARATE 27 MG IRON-FOLIC ACID 0.8 MG TABLET 1 TABLET: at 11:35

## 2022-04-29 RX ADMIN — SENNOSIDES AND DOCUSATE SODIUM 1 TABLET: 50; 8.6 TABLET ORAL at 11:35

## 2022-04-29 RX ADMIN — IBUPROFEN 800 MG: 400 TABLET ORAL at 05:35

## 2022-04-29 RX ADMIN — IBUPROFEN 800 MG: 400 TABLET ORAL at 11:35

## 2022-04-29 RX ADMIN — ACETAMINOPHEN 975 MG: 325 TABLET ORAL at 05:35

## 2022-04-29 RX ADMIN — OXYCODONE HYDROCHLORIDE 5 MG: 5 TABLET ORAL at 11:35

## 2022-04-29 ASSESSMENT — ACTIVITIES OF DAILY LIVING (ADL)
ADLS_ACUITY_SCORE: 3

## 2022-04-29 NOTE — PROGRESS NOTES
Discharge instructions reviewed with pt and family who all verbalize understanding. Prescription medications and breast pump order given to pt. Belongings sent with pt who is independently ambulatory at time of discharge.

## 2022-04-29 NOTE — PLAN OF CARE
Pt comfortable caring for herself and her infant. Is planning on discharge today. Nursing is going well, baby voiding and stooling. Pain management is adequate, denies being dizzy or lightheaded. Incision CDI.

## 2022-04-29 NOTE — PLAN OF CARE
Vital signs stable. Postpartum assessment WDL. Incision CDI/LAKIA. Pain controlled with Tylenol and Ibuprofen. Patient ambulating independently. Patient reports passing gas. Breastfeeding on cue with minima assist. Patient and infant bonding well. Will continue with current plan of care.

## 2022-04-29 NOTE — PROGRESS NOTES
[facility] Obstetrics Post-Op Progress Note  POD#2 c/s    Subjective:   Patient feels well today. She is tolerating a general diet without nausea or vomiting. Ambulating, voiding, +bm. Incision pain is okay, better with pain meds. hgbs table. Denies dizziness , light headed ness.  Patient is breastfeeding. Her mood is appropriate. Would like to go home later this afternoon.             Physical Exam:     Patient Vitals for the past 24 hrs:   BP Temp Temp src Pulse Resp   22 0809 111/71 -- -- -- --   22 0753 105/65 98.3  F (36.8  C) Oral 69 18   22 2345 107/65 98.1  F (36.7  C) Oral 79 16   22 1544 99/57 98  F (36.7  C) Oral 92 16   22 1255 103/63 98.4  F (36.9  C) Oral 90 16   22 1236 101/57 98.6  F (37  C) Oral 65 16   22 1222 100/60 98.6  F (37  C) Oral 85 16   22 1209 101/55 98.8  F (37.1  C) Oral 76 16   22 1151 102/59 98.7  F (37.1  C) Oral 77 16   22 0900 97/58 97.9  F (36.6  C) Oral 70 17     General: alert, oriented, in no acute distress   Heart: regular rate   Lungs: breathing comfortably, no sign of respiratory distress, conversational dyspnea, or accessory muscle use   Abdomen: nt - very faint eccyhmosis ant abd wall  Incision: clean, dry with steris c/d/i  LE: nt no edema          Data:   No results found for: RUBELLAABIGG  Lab Results   Component Value Date    ABO O 2019          Lab Results   Component Value Date    RH Pos 2019     Hemoglobin   Date Value Ref Range Status   2022 8.7 (L) 11.7 - 15.7 g/dL Final   2022 8.2 (L) 11.7 - 15.7 g/dL Final   Pre-op 11.6    Assessment and Plan:    Assessment:   Post-operative day #2  Low transverse repeat  section  L&D complications: Meeting postop milestones so far but labs remarkable for postop anemia (Actue blood loss anemia from delivery surgery)   -s/p iv venfoer, stable, asymptomatic          Plan:   Cont iron supplementation  Continue postop care  discharge  home today      Electronically signed by  Katy Nova DO  8:54 AM  4/29/2022  [facility]

## 2022-04-29 NOTE — LACTATION NOTE
Initial visit with Otilia, CARLENE and baby.    Breastfeeding general information reviewed.   Advised to breastfeed exclusively, on demand, avoid pacifiers, bottles and formula unless medically indicated.  Encouraged rooming in, skin to skin, feeding on demand 8-12x/day or sooner if baby cues.  Explained benefits of holding and skin to skin.  Encouraged lots of skin to skin. Instructed on hand expression.   Continues to nurse well per mom.   Instructed on signs/symptoms of engorgement/ plugged ducts and mastitis.  Instructed on comfort measures and when to call MD.  Questions answered regarding pumping and physiology of milk supply and production.  Planning to take a RX for breast pump.Getting ready for discharge.  Plan: Watch for feeding cues and feed every 2-3 hours and/or on demand. Continue to use feeding log to track intake and appropriate voids and stools. Take feeding log to first follow up appointment or weight check. Encourage skin to skin to promote frequent feedings, thermoregulation and bonding. Follow-up with healthcare provider or lactation consultant for questions or concerns. Outpatient resources reviewed.       No further questions at this time.   Will follow as needed.   Ynes Mora BSN, RN, PHN, RNC-MNN, IBCLC

## 2022-04-30 NOTE — DISCHARGE SUMMARY
[facility] Discharge Summary    Otilia Kuhn   Age: 31 year old MRN:[id number]  :1990         Date of Admission:  2022  Date of Discharge::  2022   Admitting Physician:   Mary Bowens MD  Discharge Physician:  BREANNE JAY DO     Home clinic: [facility]          Admission Diagnoses:   Previous  section [Z98.891]  Status post repeat low transverse  section [Z98.891]          Discharge Diagnosis:   S/p repeat              Procedures:   Procedure(s):  section            Medications Prior to Admission:     No medications prior to admission.             Discharge Medications:     Discharge Medication List as of 2022  2:22 PM      START taking these medications    Details   acetaminophen (TYLENOL) 500 MG tablet Take 2 tablets (1,000 mg) by mouth every 6 hours as needed for mild pain, OTC      ibuprofen (ADVIL/MOTRIN) 800 MG tablet Take 1 tablet (800 mg) by mouth every 6 hours, Disp-30 tablet, R-1, E-Prescribe      Misc. Devices (BREAST PUMP) MISC 1 each 8 times daily, Disp-1 each, R-0, Local Print      oxyCODONE (ROXICODONE) 5 MG tablet Take 1 tablet (5 mg) by mouth every 4 hours as needed for pain, Disp-8 tablet, R-0, Local Print         CONTINUE these medications which have NOT CHANGED    Details   ferrous gluconate (FERGON) 324 (38 Fe) MG tablet Take 324 mg by mouth daily (with breakfast), Historical      Prenatal Vit-Fe Fumarate-FA (PRENATAL MULTIVITAMIN W/IRON) 27-0.8 MG tablet Take 1 tablet by mouth daily, Historical                   Consultations:   No consultations were requested during this admission            Brief History of Labor:   Pt admitted for repeat            Hospital Course:   The patient's hospital course was unremarkable.  On discharge, her pain was well controlled. Vaginal bleeding is similar to peak menstrual flow.  Voiding without difficulty.  Ambulating well and tolerating a normal diet.  No fever.   Breastfeeding well.  Infant is stable.  No bowel movement yet.*  She was discharged on post-partum day #2.    Post-partum hemoglobin:   Hemoglobin   Date Value Ref Range Status   04/28/2022 8.7 (L) 11.7 - 15.7 g/dL Final   06/18/2019 8.9 (L) 11.7 - 15.7 g/dL Final   pre-op: 11         Discharge Instructions and Follow-Up:   Discharge diet: Regular   Discharge activity: Nothing in Vagina for 6 weeks - nio intercourse, tampons.  no driving for 2 weeks. No heavy lifting for 6 weeks>15#   Discharge follow-up: RTC Dr Bowens in 6 weeks for PP check. 2 week VV            Discharge Disposition:   Discharged to home      Electronically signed by  Katy Nova DO  [phone]  8:52 AM  4/29/2022  [facility]

## 2024-05-08 PROBLEM — Z00.00 ENCOUNTER FOR PREVENTIVE HEALTH EXAMINATION: Status: ACTIVE | Noted: 2024-05-08

## 2024-05-09 ENCOUNTER — APPOINTMENT (OUTPATIENT)
Dept: OBGYN | Facility: CLINIC | Age: 34
End: 2024-05-09
Payer: MEDICAID

## 2024-05-09 ENCOUNTER — NON-APPOINTMENT (OUTPATIENT)
Age: 34
End: 2024-05-09

## 2024-05-09 ENCOUNTER — LABORATORY RESULT (OUTPATIENT)
Age: 34
End: 2024-05-09

## 2024-05-09 VITALS
BODY MASS INDEX: 22.66 KG/M2 | HEIGHT: 61 IN | WEIGHT: 120 LBS | DIASTOLIC BLOOD PRESSURE: 70 MMHG | SYSTOLIC BLOOD PRESSURE: 110 MMHG

## 2024-05-09 DIAGNOSIS — Z12.4 ENCOUNTER FOR SCREENING FOR MALIGNANT NEOPLASM OF CERVIX: ICD-10-CM

## 2024-05-09 PROCEDURE — 99395 PREV VISIT EST AGE 18-39: CPT | Mod: 25

## 2024-05-09 PROCEDURE — 99459 PELVIC EXAMINATION: CPT

## 2024-05-09 PROCEDURE — 99213 OFFICE O/P EST LOW 20 MIN: CPT | Mod: 25

## 2024-05-09 NOTE — PLAN
[FreeTextEntry1] : # Pap completed today w/ STI testing from pap # PCP - urged pt to make an appt w/ a PCP given strong famhx of possible CAD - she will ask for serum STI testing there as she prefers to avoid 2 blood draws # Contraception - We discussed her contraceptive options including: COCs, nuva ring, patch, POPs, copper IUD, LNG-IUD and nexplanon. Discussed R/B of each including COCs long term data showing decrease in ovarian cancer, LNG-IUD and decreased risk of uterine and ovarian cancer and the added benefits when using COCs or Prog-only methods of decreased bleeding, possible amenorrhea and often decreased dysmenorrhea. She continues to desire an IUD and will read more about her options on [email or web address] with a focus on LNG-IUD/Kyleen and let us know to order it  RTO for IUD placement

## 2024-05-09 NOTE — HISTORY OF PRESENT ILLNESS
[Patient reported PAP Smear was normal] : Patient reported PAP Smear was normal [Y] : Positive pregnancy history [Normal Amount/Duration] :  normal amount and duration [Regular Cycle Intervals] : periods have been regular [Currently Active] : currently active [PapSmeardate] : 4/2022 [LMPDate] : 4/25/2024 [MensesFreq] : 28 [MensesLength] : 6 [PGHxTotal] : 2 [[facility] : 2 [FreeTextEntry1] : 4/25/2024

## 2024-05-09 NOTE — PHYSICAL EXAM
[Chaperone Present] : A chaperone was present in the examining room during all aspects of the physical examination [[id number]] : A chaperone was present during the pelvic exam. [Appropriately responsive] : appropriately responsive [Alert] : alert [No Acute Distress] : no acute distress [Soft] : soft [Non-tender] : non-tender [Non-distended] : non-distended [Oriented x3] : oriented x3 [Examination Of The Breasts] : a normal appearance [No Masses] : no breast masses were palpable [Labia Majora] : normal [Labia Minora] : normal [No Bleeding] : There was no active vaginal bleeding [Normal] : normal [Tenderness] : nontender [Enlarged ___ wks] : not enlarged [Uterine Adnexae] : non-palpable

## 2024-05-10 LAB — HPV HIGH+LOW RISK DNA PNL CVX: NOT DETECTED

## 2024-05-13 LAB — CYTOLOGY CVX/VAG DOC THIN PREP: NORMAL

## 2024-05-24 ENCOUNTER — APPOINTMENT (OUTPATIENT)
Dept: DERMATOLOGY | Facility: CLINIC | Age: 34
End: 2024-05-24

## 2024-05-29 ENCOUNTER — LABORATORY RESULT (OUTPATIENT)
Age: 34
End: 2024-05-29

## 2024-05-29 ENCOUNTER — APPOINTMENT (OUTPATIENT)
Dept: FAMILY MEDICINE | Facility: CLINIC | Age: 34
End: 2024-05-29
Payer: MEDICAID

## 2024-05-29 ENCOUNTER — TRANSCRIPTION ENCOUNTER (OUTPATIENT)
Age: 34
End: 2024-05-29

## 2024-05-29 VITALS
DIASTOLIC BLOOD PRESSURE: 69 MMHG | TEMPERATURE: 97.5 F | OXYGEN SATURATION: 100 % | WEIGHT: 120 LBS | HEIGHT: 61 IN | BODY MASS INDEX: 22.66 KG/M2 | HEART RATE: 62 BPM | SYSTOLIC BLOOD PRESSURE: 103 MMHG | RESPIRATION RATE: 14 BRPM

## 2024-05-29 DIAGNOSIS — Z86.59 PERSONAL HISTORY OF OTHER MENTAL AND BEHAVIORAL DISORDERS: ICD-10-CM

## 2024-05-29 DIAGNOSIS — Z80.8 FAMILY HISTORY OF MALIGNANT NEOPLASM OF OTHER ORGANS OR SYSTEMS: ICD-10-CM

## 2024-05-29 DIAGNOSIS — R53.83 OTHER FATIGUE: ICD-10-CM

## 2024-05-29 DIAGNOSIS — Z82.49 FAMILY HISTORY OF ISCHEMIC HEART DISEASE AND OTHER DISEASES OF THE CIRCULATORY SYSTEM: ICD-10-CM

## 2024-05-29 DIAGNOSIS — Z72.51 HIGH RISK HETEROSEXUAL BEHAVIOR: ICD-10-CM

## 2024-05-29 PROCEDURE — 36415 COLL VENOUS BLD VENIPUNCTURE: CPT

## 2024-05-29 PROCEDURE — 99385 PREV VISIT NEW AGE 18-39: CPT

## 2024-05-29 NOTE — HISTORY OF PRESENT ILLNESS
[de-identified] : 33 years old female presents to [facility] care.  She c/o fatigue despite getting 6-9 hours of sleep.  [place] and is getting adjusted. She noted a history of anxiety but reports no symptoms now.

## 2024-05-29 NOTE — HEALTH RISK ASSESSMENT
[Good] : ~his/her~  mood as  good [No] : No [No falls in past year] : Patient reported no falls in the past year [0] : 2) Feeling down, depressed, or hopeless: Not at all (0) [PHQ-2 Negative - No further assessment needed] : PHQ-2 Negative - No further assessment needed [Former] : Former [0-4] : 0-4 [< 15 Years] : < 15 Years [Patient reported PAP Smear was normal] : Patient reported PAP Smear was normal [HIV Test offered] : HIV Test offered [Hepatitis C test offered] : Hepatitis C test offered [With Family] : lives with family [# of Members in Household ___] :  household currently consist of [unfilled] member(s) [Employed] : employed [Unemployed] : unemployed [] :  [# Of Children ___] : has [unfilled] children [Sexually Active] : sexually active [Feels Safe at Home] : Feels safe at home [Fully functional (bathing, dressing, toileting, transferring, walking, feeding)] : Fully functional (bathing, dressing, toileting, transferring, walking, feeding) [Fully functional (using the telephone, shopping, preparing meals, housekeeping, doing laundry, using] : Fully functional and needs no help or supervision to perform IADLs (using the telephone, shopping, preparing meals, housekeeping, doing laundry, using transportation, managing medications and managing finances) [Change in mental status noted] : No change in mental status noted [Language] : denies difficulty with language [High Risk Behavior] : no high risk behavior [Reports changes in hearing] : Reports no changes in hearing [Reports changes in vision] : Reports no changes in vision [Reports changes in dental health] : Reports no changes in dental health [Travel to Developing Areas] : does not  travel to developing areas [TB Exposure] : is not being exposed to tuberculosis [PapSmearComments] : 1 week ago

## 2024-05-30 LAB
ALBUMIN SERPL ELPH-MCNC: 4.5 G/DL
ALP BLD-CCNC: 47 U/L
ALT SERPL-CCNC: 12 U/L
ANION GAP SERPL CALC-SCNC: 11 MMOL/L
AST SERPL-CCNC: 12 U/L
BASOPHILS # BLD AUTO: 0.03 K/UL
BASOPHILS NFR BLD AUTO: 0.8 %
BILIRUB SERPL-MCNC: 1.3 MG/DL
BUN SERPL-MCNC: 11 MG/DL
CALCIUM SERPL-MCNC: 9.5 MG/DL
CHLORIDE SERPL-SCNC: 103 MMOL/L
CHOLEST SERPL-MCNC: 159 MG/DL
CO2 SERPL-SCNC: 23 MMOL/L
CREAT SERPL-MCNC: 0.68 MG/DL
EGFR: 117 ML/MIN/1.73M2
EOSINOPHIL # BLD AUTO: 0.06 K/UL
EOSINOPHIL NFR BLD AUTO: 1.5 %
ESTIMATED AVERAGE GLUCOSE: 97 MG/DL
GLUCOSE SERPL-MCNC: 97 MG/DL
HBA1C MFR BLD HPLC: 5 %
HBV SURFACE AB SER QL: REACTIVE
HBV SURFACE AG SER QL: NONREACTIVE
HCT VFR BLD CALC: 34.4 %
HCV AB SER QL: NONREACTIVE
HCV S/CO RATIO: 0.11 S/CO
HDLC SERPL-MCNC: 49 MG/DL
HGB BLD-MCNC: 11.9 G/DL
HIV1+2 AB SPEC QL IA.RAPID: NONREACTIVE
IMM GRANULOCYTES NFR BLD AUTO: 0.3 %
LDLC SERPL CALC-MCNC: 99 MG/DL
LYMPHOCYTES # BLD AUTO: 1.53 K/UL
LYMPHOCYTES NFR BLD AUTO: 38.6 %
MAN DIFF?: NORMAL
MCHC RBC-ENTMCNC: 30 PG
MCHC RBC-ENTMCNC: 34.6 GM/DL
MCV RBC AUTO: 86.6 FL
MONOCYTES # BLD AUTO: 0.25 K/UL
MONOCYTES NFR BLD AUTO: 6.3 %
NEUTROPHILS # BLD AUTO: 2.08 K/UL
NEUTROPHILS NFR BLD AUTO: 52.5 %
NONHDLC SERPL-MCNC: 110 MG/DL
PLATELET # BLD AUTO: 242 K/UL
POTASSIUM SERPL-SCNC: 4.8 MMOL/L
PROT SERPL-MCNC: 7 G/DL
RBC # BLD: 3.97 M/UL
RBC # FLD: 13 %
SODIUM SERPL-SCNC: 138 MMOL/L
T PALLIDUM AB SER QL IA: NEGATIVE
TRIGL SERPL-MCNC: 52 MG/DL
TSH SERPL-ACNC: 1.21 UIU/ML
WBC # FLD AUTO: 3.96 K/UL

## 2024-06-07 ENCOUNTER — APPOINTMENT (OUTPATIENT)
Dept: DERMATOLOGY | Facility: CLINIC | Age: 34
End: 2024-06-07
Payer: MEDICAID

## 2024-06-07 DIAGNOSIS — L70.0 ACNE VULGARIS: ICD-10-CM

## 2024-06-07 DIAGNOSIS — D23.9 OTHER BENIGN NEOPLASM OF SKIN, UNSPECIFIED: ICD-10-CM

## 2024-06-07 DIAGNOSIS — Z12.83 ENCOUNTER FOR SCREENING FOR MALIGNANT NEOPLASM OF SKIN: ICD-10-CM

## 2024-06-07 DIAGNOSIS — D22.9 MELANOCYTIC NEVI, UNSPECIFIED: ICD-10-CM

## 2024-06-07 PROCEDURE — 99204 OFFICE O/P NEW MOD 45 MIN: CPT

## 2024-06-07 RX ORDER — TRETINOIN 0.25 MG/G
0.03 CREAM TOPICAL
Qty: 1 | Refills: 3 | Status: ACTIVE | COMMUNITY
Start: 2024-06-07 | End: 1900-01-01

## 2024-06-08 PROBLEM — D23.9 DERMATOFIBROMA: Status: ACTIVE | Noted: 2024-06-08

## 2024-06-08 PROBLEM — D22.9 MULTIPLE BENIGN MELANOCYTIC NEVI: Status: ACTIVE | Noted: 2024-06-08

## 2024-06-08 PROBLEM — Z12.83 SCREENING EXAM FOR SKIN CANCER: Status: ACTIVE | Noted: 2024-06-08

## 2024-06-08 NOTE — ASSESSMENT
[FreeTextEntry1] : #Acne- chronic, mild, flaring Start tretinoin 0.025% cream. SED. Discussed proper use, including using a pea-sized amount for entire face, starting every other night and increasing to nightly use as tolerated, and liberal use of oil-free, non-comedogenic moisturizer such as Cetaphil or CeraVe  #DF - Reassured about benign appearance today, RTC for any significant change - Discouraged against cosmetic removal via shave as strong likelihood of re-growth  # Multiple melanocytic nevi, all benign appearing on today's exam -Sun protection was discussed. The proper use of broad spectrum UVB/UVA sunscreen with SPF 30 or greater was reviewed and the need for re-application after swimming or sweating or 2-3 hours was emphasized. We discussed judicious use of clothing and avoidance of peak periods of sun exposure. I made the patient aware of need for year-round protection. ABCDEs of melanoma reviewed. -Self-skin check also reviewed -Counseled pt to monitor for changes   # Screening for skin cancer -ABCDEs of melanoma, sun safety, and self-skin check reviewed -Counseled pt to notify us of any changing or concerning lesions -RTC 12 months, sooner prn

## 2024-06-08 NOTE — PHYSICAL EXAM
[FreeTextEntry3] :  AAOx3, NAD, well-appearing / pleasant Total body skin exam performed within normal limits with the exception of:  - Patient deferred examination of genitalia  Firm uniform pink-brown papule with central hypopigmentation right calf Fairly uniform and regular brown macules and papules on the trunk and extremities Few comedones over face

## 2024-06-08 NOTE — HISTORY OF PRESENT ILLNESS
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 No personal hx of skin cancer. Dad with melanoma  Also with some facial acne, not using anything prescription

## (undated) DEVICE — GLOVE PROTEXIS BLUE W/NEU-THERA 7.0  2D73EB70

## (undated) DEVICE — GLOVE PROTEXIS POWDER FREE 6.5 ORTHOPEDIC 2D73ET65

## (undated) DEVICE — SUCTION CANISTER MEDIVAC LINER 3000ML W/LID 65651-530

## (undated) DEVICE — CATH TRAY FOLEY 16FR BARDEX W/DRAIN BAG STATLOCK 300316A

## (undated) DEVICE — ESU GROUND PAD UNIVERSAL W/O CORD

## (undated) DEVICE — LINEN C-SECTION 5415

## (undated) DEVICE — BLADE CLIPPER 4406

## (undated) DEVICE — GLOVE PROTEXIS W/NEU-THERA 6.5  2D73TE65

## (undated) DEVICE — DRSG TELFA 3X8" 1238

## (undated) DEVICE — PREP CHLORAPREP 26ML TINTED ORANGE  260815

## (undated) DEVICE — SURGICEL POWDER ABSORBABLE HEMOSTAT 3GM 3013SP

## (undated) DEVICE — SOL NACL 0.9% IRRIG 1000ML BOTTLE 07138-09

## (undated) DEVICE — GLOVE PROTEXIS W/NEU-THERA 6.0  2D73TE60

## (undated) DEVICE — SU VICRYL 2-0 CT-1 27" J339H

## (undated) DEVICE — SU MONOCRYL 4-0 PS-2 18" UND Y496G

## (undated) DEVICE — PACK C-SECTION LF PL15OTA83B

## (undated) DEVICE — SU VICRYL 3-0 SH 27" J316H

## (undated) DEVICE — SU MONOCRYL 3-0 SH 27" UND Y416H

## (undated) DEVICE — SU VICRYL 0 CT 36" J358H

## (undated) DEVICE — SU VICRYL 0 CT-1 27" J340H

## (undated) DEVICE — GLOVE PROTEXIS W/NEU-THERA 7.0  2D73TE70

## (undated) DEVICE — DRSG GAUZE 4X4" TOPPER

## (undated) RX ORDER — OXYTOCIN/0.9 % SODIUM CHLORIDE 30/500 ML
PLASTIC BAG, INJECTION (ML) INTRAVENOUS
Status: DISPENSED
Start: 2022-04-27

## (undated) RX ORDER — MORPHINE SULFATE 1 MG/ML
INJECTION, SOLUTION EPIDURAL; INTRATHECAL; INTRAVENOUS
Status: DISPENSED
Start: 2022-04-27